# Patient Record
Sex: MALE | Race: WHITE | NOT HISPANIC OR LATINO | Employment: OTHER | URBAN - METROPOLITAN AREA
[De-identification: names, ages, dates, MRNs, and addresses within clinical notes are randomized per-mention and may not be internally consistent; named-entity substitution may affect disease eponyms.]

---

## 2017-09-02 ENCOUNTER — APPOINTMENT (OUTPATIENT)
Dept: MRI IMAGING | Facility: CLINIC | Age: 82
End: 2017-09-02
Attending: EMERGENCY MEDICINE
Payer: MEDICARE

## 2017-09-02 ENCOUNTER — HOSPITAL ENCOUNTER (OUTPATIENT)
Facility: CLINIC | Age: 82
Setting detail: OBSERVATION
Discharge: HOME | End: 2017-09-03
Attending: EMERGENCY MEDICINE | Admitting: INTERNAL MEDICINE
Payer: MEDICARE

## 2017-09-02 DIAGNOSIS — G45.8 OTHER SPECIFIED TRANSIENT CEREBRAL ISCHEMIAS: ICD-10-CM

## 2017-09-02 DIAGNOSIS — F02.818 EARLY ONSET ALZHEIMER'S DISEASE WITH BEHAVIORAL DISTURBANCE (H): ICD-10-CM

## 2017-09-02 DIAGNOSIS — I10 ESSENTIAL HYPERTENSION: ICD-10-CM

## 2017-09-02 DIAGNOSIS — G45.3 AMAUROSIS FUGAX: Primary | ICD-10-CM

## 2017-09-02 DIAGNOSIS — G30.0 EARLY ONSET ALZHEIMER'S DISEASE WITH BEHAVIORAL DISTURBANCE (H): ICD-10-CM

## 2017-09-02 LAB
ANION GAP SERPL CALCULATED.3IONS-SCNC: 6 MMOL/L (ref 3–14)
BASOPHILS # BLD AUTO: 0.1 10E9/L (ref 0–0.2)
BASOPHILS NFR BLD AUTO: 1.2 %
BUN SERPL-MCNC: 20 MG/DL (ref 7–30)
CALCIUM SERPL-MCNC: 8.5 MG/DL (ref 8.5–10.1)
CHLORIDE SERPL-SCNC: 106 MMOL/L (ref 94–109)
CO2 SERPL-SCNC: 30 MMOL/L (ref 20–32)
CREAT SERPL-MCNC: 1.17 MG/DL (ref 0.66–1.25)
DIFFERENTIAL METHOD BLD: ABNORMAL
EOSINOPHIL # BLD AUTO: 0.1 10E9/L (ref 0–0.7)
EOSINOPHIL NFR BLD AUTO: 2 %
ERYTHROCYTE [DISTWIDTH] IN BLOOD BY AUTOMATED COUNT: 13.3 % (ref 10–15)
GFR SERPL CREATININE-BSD FRML MDRD: 59 ML/MIN/1.7M2
GLUCOSE SERPL-MCNC: 82 MG/DL (ref 70–99)
HCT VFR BLD AUTO: 40.1 % (ref 40–53)
HGB BLD-MCNC: 13.5 G/DL (ref 13.3–17.7)
IMM GRANULOCYTES # BLD: 0 10E9/L (ref 0–0.4)
IMM GRANULOCYTES NFR BLD: 0 %
LYMPHOCYTES # BLD AUTO: 2.1 10E9/L (ref 0.8–5.3)
LYMPHOCYTES NFR BLD AUTO: 34.4 %
MCH RBC QN AUTO: 31.8 PG (ref 26.5–33)
MCHC RBC AUTO-ENTMCNC: 33.7 G/DL (ref 31.5–36.5)
MCV RBC AUTO: 94 FL (ref 78–100)
MONOCYTES # BLD AUTO: 0.6 10E9/L (ref 0–1.3)
MONOCYTES NFR BLD AUTO: 9.7 %
NEUTROPHILS # BLD AUTO: 3.2 10E9/L (ref 1.6–8.3)
NEUTROPHILS NFR BLD AUTO: 52.7 %
NRBC # BLD AUTO: 0 10*3/UL
NRBC BLD AUTO-RTO: 0 /100
PLATELET # BLD AUTO: 217 10E9/L (ref 150–450)
POTASSIUM SERPL-SCNC: 3.9 MMOL/L (ref 3.4–5.3)
RBC # BLD AUTO: 4.25 10E12/L (ref 4.4–5.9)
SODIUM SERPL-SCNC: 142 MMOL/L (ref 133–144)
WBC # BLD AUTO: 6.1 10E9/L (ref 4–11)

## 2017-09-02 PROCEDURE — 70553 MRI BRAIN STEM W/O & W/DYE: CPT

## 2017-09-02 PROCEDURE — 70549 MR ANGIOGRAPH NECK W/O&W/DYE: CPT

## 2017-09-02 PROCEDURE — A9585 GADOBUTROL INJECTION: HCPCS | Performed by: RADIOLOGY

## 2017-09-02 PROCEDURE — 80048 BASIC METABOLIC PNL TOTAL CA: CPT | Performed by: EMERGENCY MEDICINE

## 2017-09-02 PROCEDURE — 93005 ELECTROCARDIOGRAM TRACING: CPT

## 2017-09-02 PROCEDURE — 25000128 H RX IP 250 OP 636: Performed by: RADIOLOGY

## 2017-09-02 PROCEDURE — 99285 EMERGENCY DEPT VISIT HI MDM: CPT | Mod: 25

## 2017-09-02 PROCEDURE — 70544 MR ANGIOGRAPHY HEAD W/O DYE: CPT | Mod: XS

## 2017-09-02 PROCEDURE — 85025 COMPLETE CBC W/AUTO DIFF WBC: CPT | Performed by: EMERGENCY MEDICINE

## 2017-09-02 RX ORDER — GADOBUTROL 604.72 MG/ML
10 INJECTION INTRAVENOUS ONCE
Status: COMPLETED | OUTPATIENT
Start: 2017-09-02 | End: 2017-09-02

## 2017-09-02 RX ADMIN — GADOBUTROL 10 ML: 604.72 INJECTION INTRAVENOUS at 21:34

## 2017-09-02 NOTE — IP AVS SNAPSHOT
MRN:[id number]                      After Visit Summary   9/2/2017    Milton Marmolejo    MRN: [id number]           Thank you!     Thank you for choosing [facility] for your care. Our goal is always to provide you with excellent care. Hearing back from our patients is one way we can continue to improve our services. Please take a few minutes to complete the written survey that you may receive in the mail after you visit. If you would like to speak to someone directly about your visit please contact Patient Relations at [phone]. Thank you!          Patient Information     Date Of Birth          8/28/1932        About your hospital stay     You were admitted on:  September 3, 2017 You last received care in the:  [facility] Observation Department    You were discharged on:  September 3, 2017        Reason for your hospital stay       You were seen and admitted for episode of garbled speech and arm weakness.  You likely had a TIA (no CVA).  You were seen by Neurology and recommendations were to continue with a baby ASA and close monitoring of your BP with your PCP.                  Who to Call     For medical emergencies, please call 911.  For non-urgent questions about your medical care, please call your primary care provider or clinic, None          Attending Provider     Provider Specialty    Carolina Carlson MD Emergency Medicine    Carmel, Eemly Tse MD Internal Medicine    Laura Ohara,  Internal Medicine       Primary Care Provider Fax #    [facility] [phone]      After Care Instructions     Activity       Your activity upon discharge: activity as tolerated and no driving for today            Diet       Follow this diet upon discharge: resume home cardiac diet                  Follow-up Appointments     Follow-up and recommended labs and tests        F/u with PCP within 1-2 wks for hospital f/u and a BP check.  F/U with  "Neurology within one months for hospital f/u (referral sent)                  Additional Services     Home Care OT Referral for Hospital Discharge       OT to eval and treat    Your provider has ordered home care - occupational therapy. If you have not been contacted within 2 days of your discharge please call the department phone number listed on the top of this document.            Home care nursing referral       RN skilled nursing visit. RN to assess vital signs and weight and respiratory and cardiac status.    Your provider has ordered home care nursing services. If you have not been contacted within 2 days of your discharge please call the inpatient department phone number at [phone] .            Neurology Adult Referral                            Pending Results     Date and Time Order Name Status Description    9/3/2017 1246 Anti Nuclear Janiya IgG by IFA with Reflex In process     9/3/2017 1246 Antineutrophil cytoplasmic Janiya IgG In process             Statement of Approval     Ordered          09/03/17 1424  I have reviewed and agree with all the recommendations and orders detailed in this document.  EFFECTIVE NOW     Approved and electronically signed by:  Laura Ohara DO             Admission Information     Date & Time Provider Department Dept. Phone    9/2/2017 Laura Ohara,  [facility] Observation Department [phone]      Your Vitals Were     Blood Pressure Pulse Temperature Respirations Height Weight    125/77 76 97.6  F (36.4  C) (Oral) 18 1.778 m (5' 10\") 79.8 kg (176 lb)    Pulse Oximetry BMI (Body Mass Index)                95% 25.25 kg/m2          MyChart Information     [employer] lets you send messages to your doctor, view your test results, renew your prescriptions, schedule appointments and more. To sign up, go to [email or web address] . Click on \"Log in\" on the left side of the screen, which will take you to the Welcome page. Then click on \"Sign " "up Now\" on the right side of the page.     You will be asked to enter the access code listed below, as well as some personal information. Please follow the directions to create your username and password.     Your access code is: [email or web address]  Expires: 2017 11:26 PM     Your access code will  in 90 days. If you need help or a new code, please call your [facility] clinic or [phone].        Care EveryWhere ID     This is your Care EveryWhere ID. This could be used by other organizations to access your [facility] medical records  [id number]        Equal Access to Services     : Hadjace Martinez, debra whitlock, sydnee fragoso, true aaron . So [facility] [phone].    ATENCIÓN: Si habla español, tiene a moore disposición servicios gratuitos de asistencia lingüística. Llame al [phone].    We comply with applicable federal civil rights laws and [place] laws. We do not discriminate on the basis of race, color, national origin, age, disability sex, sexual orientation or gender identity.               Review of your medicines      CONTINUE these medicines which have NOT CHANGED        Dose / Directions    ACETAMINOPHEN PO        Dose:  325 mg   Take 325 mg by mouth every 6 hours as needed for pain   Refills:  0       ASPIRIN PO        Dose:  81 mg   Take 81 mg by mouth At Bedtime   Refills:  0       donepezil 2.5 mg half-tab   Commonly known as:  ARICEPT        Dose:  2.5 mg   Take 2.5 mg by mouth At Bedtime   Refills:  0       fluticasone 50 MCG/ACT spray   Commonly known as:  FLONASE        Dose:  2 spray   Spray 2 sprays into both nostrils daily as needed   Refills:  0       multivitamin, therapeutic with minerals Tabs tablet        Dose:  1 tablet   Take 1 tablet by mouth daily   Refills:  0       SIMVASTATIN PO        Dose:  40 mg   Take 40 mg by mouth At Bedtime   Refills:  0       tamsulosin 0.4 MG capsule   Commonly known as:  FLOMAX    "     Dose:  0.4 mg   Take 0.4 mg by mouth daily   Refills:  0       VASOTEC PO        Dose:  5 mg   Take 5 mg by mouth At Bedtime   Refills:  0                Protect others around you: Learn how to safely use, store and throw away your medicines at www.disposemymeds.org.             Medication List: This is a list of all your medications and when to take them. Check marks below indicate your daily home schedule. Keep this list as a reference.      Medications           Morning Afternoon Evening Bedtime As Needed    ACETAMINOPHEN PO   Take 325 mg by mouth every 6 hours as needed for pain                                ASPIRIN PO   Take 81 mg by mouth At Bedtime                                donepezil 2.5 mg half-tab   Commonly known as:  ARICEPT   Take 2.5 mg by mouth At Bedtime                                fluticasone 50 MCG/ACT spray   Commonly known as:  FLONASE   Spray 2 sprays into both nostrils daily as needed                                multivitamin, therapeutic with minerals Tabs tablet   Take 1 tablet by mouth daily                                SIMVASTATIN PO   Take 40 mg by mouth At Bedtime                                tamsulosin 0.4 MG capsule   Commonly known as:  FLOMAX   Take 0.4 mg by mouth daily                                VASOTEC PO   Take 5 mg by mouth At Bedtime

## 2017-09-02 NOTE — IP AVS SNAPSHOT
[facility] Observation Department    [address]    [facility] [address]    Phone:  [phone]                                       After Visit Summary   9/2/2017    Milton Marmolejo    MRN: [id number]           After Visit Summary Signature Page     I have received my discharge instructions, and my questions have been answered. I have discussed any challenges I see with this plan with the nurse or doctor.    ..........................................................................................................................................  Patient/Patient Representative Signature      ..........................................................................................................................................  Patient Representative Print Name and Relationship to Patient    ..................................................               ................................................  Date                                            Time    ..........................................................................................................................................  Reviewed by Signature/Title    ...................................................              ..............................................  Date                                                            Time

## 2017-09-03 ENCOUNTER — APPOINTMENT (OUTPATIENT)
Dept: CARDIOLOGY | Facility: CLINIC | Age: 82
End: 2017-09-03
Attending: INTERNAL MEDICINE
Payer: MEDICARE

## 2017-09-03 VITALS
BODY MASS INDEX: 25.2 KG/M2 | OXYGEN SATURATION: 95 % | WEIGHT: 176 LBS | DIASTOLIC BLOOD PRESSURE: 77 MMHG | SYSTOLIC BLOOD PRESSURE: 125 MMHG | TEMPERATURE: 97.6 F | HEART RATE: 76 BPM | RESPIRATION RATE: 18 BRPM | HEIGHT: 70 IN

## 2017-09-03 PROBLEM — G45.9 TIA (TRANSIENT ISCHEMIC ATTACK): Status: ACTIVE | Noted: 2017-09-03

## 2017-09-03 LAB
CHOLEST SERPL-MCNC: 115 MG/DL
CRP SERPL-MCNC: <2.9 MG/L (ref 0–8)
CRP SERPL-MCNC: <2.9 MG/L (ref 0–8)
ERYTHROCYTE [SEDIMENTATION RATE] IN BLOOD BY WESTERGREN METHOD: 11 MM/H (ref 0–20)
GLUCOSE BLDC GLUCOMTR-MCNC: 85 MG/DL (ref 70–99)
GLUCOSE SERPL-MCNC: 94 MG/DL (ref 70–99)
HBA1C MFR BLD: 5.8 % (ref 4.3–6)
HDLC SERPL-MCNC: 50 MG/DL
INTERPRETATION ECG - MUSE: NORMAL
LDLC SERPL CALC-MCNC: 56 MG/DL
NONHDLC SERPL-MCNC: 65 MG/DL
TRIGL SERPL-MCNC: 43 MG/DL
TSH SERPL DL<=0.005 MIU/L-ACNC: 2.11 MU/L (ref 0.4–4)

## 2017-09-03 PROCEDURE — 86255 FLUORESCENT ANTIBODY SCREEN: CPT | Performed by: INTERNAL MEDICINE

## 2017-09-03 PROCEDURE — 86140 C-REACTIVE PROTEIN: CPT | Performed by: INTERNAL MEDICINE

## 2017-09-03 PROCEDURE — 82947 ASSAY GLUCOSE BLOOD QUANT: CPT | Performed by: INTERNAL MEDICINE

## 2017-09-03 PROCEDURE — 36415 COLL VENOUS BLD VENIPUNCTURE: CPT | Performed by: INTERNAL MEDICINE

## 2017-09-03 PROCEDURE — 80061 LIPID PANEL: CPT | Performed by: INTERNAL MEDICINE

## 2017-09-03 PROCEDURE — G0378 HOSPITAL OBSERVATION PER HR: HCPCS

## 2017-09-03 PROCEDURE — 99207 ZZC APP CREDIT; MD BILLING SHARED VISIT: CPT | Performed by: INTERNAL MEDICINE

## 2017-09-03 PROCEDURE — 93306 TTE W/DOPPLER COMPLETE: CPT

## 2017-09-03 PROCEDURE — 99207 ZZC CDG-CODE CATEGORY CHANGED: CPT | Performed by: INTERNAL MEDICINE

## 2017-09-03 PROCEDURE — 83036 HEMOGLOBIN GLYCOSYLATED A1C: CPT | Performed by: INTERNAL MEDICINE

## 2017-09-03 PROCEDURE — 99234 HOSP IP/OBS SM DT SF/LOW 45: CPT | Performed by: INTERNAL MEDICINE

## 2017-09-03 PROCEDURE — 85652 RBC SED RATE AUTOMATED: CPT | Performed by: INTERNAL MEDICINE

## 2017-09-03 PROCEDURE — 86038 ANTINUCLEAR ANTIBODIES: CPT | Performed by: INTERNAL MEDICINE

## 2017-09-03 PROCEDURE — 84443 ASSAY THYROID STIM HORMONE: CPT | Performed by: INTERNAL MEDICINE

## 2017-09-03 PROCEDURE — 93306 TTE W/DOPPLER COMPLETE: CPT | Mod: 26 | Performed by: INTERNAL MEDICINE

## 2017-09-03 PROCEDURE — 82947 ASSAY GLUCOSE BLOOD QUANT: CPT | Performed by: PHYSICIAN ASSISTANT

## 2017-09-03 PROCEDURE — 00000146 ZZHCL STATISTIC GLUCOSE BY METER IP

## 2017-09-03 PROCEDURE — 86039 ANTINUCLEAR ANTIBODIES (ANA): CPT | Performed by: INTERNAL MEDICINE

## 2017-09-03 RX ORDER — NALOXONE HYDROCHLORIDE 0.4 MG/ML
.1-.4 INJECTION, SOLUTION INTRAMUSCULAR; INTRAVENOUS; SUBCUTANEOUS
Status: DISCONTINUED | OUTPATIENT
Start: 2017-09-03 | End: 2017-09-03 | Stop reason: HOSPADM

## 2017-09-03 RX ORDER — CLOPIDOGREL BISULFATE 75 MG/1
75 TABLET ORAL DAILY
Status: DISCONTINUED | OUTPATIENT
Start: 2017-09-03 | End: 2017-09-03

## 2017-09-03 RX ORDER — LIDOCAINE 40 MG/G
CREAM TOPICAL
Status: DISCONTINUED | OUTPATIENT
Start: 2017-09-03 | End: 2017-09-03 | Stop reason: HOSPADM

## 2017-09-03 RX ORDER — ACETAMINOPHEN 325 MG/1
650 TABLET ORAL EVERY 4 HOURS PRN
Status: DISCONTINUED | OUTPATIENT
Start: 2017-09-03 | End: 2017-09-03 | Stop reason: HOSPADM

## 2017-09-03 RX ORDER — LABETALOL HYDROCHLORIDE 5 MG/ML
10-40 INJECTION, SOLUTION INTRAVENOUS EVERY 10 MIN PRN
Status: DISCONTINUED | OUTPATIENT
Start: 2017-09-03 | End: 2017-09-03 | Stop reason: HOSPADM

## 2017-09-03 ASSESSMENT — ENCOUNTER SYMPTOMS
SPEECH DIFFICULTY: 1
NUMBNESS: 0
HEADACHES: 0
FEVER: 1
WEAKNESS: 0

## 2017-09-03 NOTE — ED NOTES
"[facility]  ED Nurse Handoff Report    Milton Marmolejo is a 85 year old male   ED Chief complaint: stroke like symptoms  . ED Diagnosis:   Final diagnoses:   Other specified transient cerebral ischemias   Essential hypertension     Allergies: No Known Allergies    Code Status: Full Code  Activity level - Baseline/Home:  Independent. Activity Level - Current:   Independent. Lift room needed: No. Bariatric: No   Needed: No   Isolation: No. Infection: Not Applicable.     Vital Signs:   Vitals:    09/02/17 2227 09/02/17 2228 09/02/17 2230 09/02/17 2245   BP:       Pulse:       Resp:       Temp:       TempSrc:       SpO2: 96% 96% 95% 96%   Weight:       Height:           Cardiac Rhythm:  ,      Pain level:    Patient confused: No. Patient Falls Risk: No.   Elimination Status: Has voided   Patient Report - Initial Complaint: Pt presents to ED with complaints of difficulty finding words starting around 7pm. Pt says he had \"trouble adjusting the TV and I didn't seem quite right.\" Pt has hx of TIA. Pt says he feels like his symptoms are mostly resolved now. ABCs intact. A&Ox3. Wife says pt had garbled speech for about 5 minutes. Focused Assessment: Cognitive/Perceptual/Neuro - Cognitive/Neuro/Behavioral WDL:  WDL except Best Language: 0 - No aphasia LUE Sensation: no numbness; no tingling RUE Sensation: no numbness; no tingling LLE Sensation: no tingling; no numbness RLE Sensation: no tingling; no numbness Neurological Comment: episode of garbled speech and confusion around 7pm today; some mild confusion d/t dementia; family says pt at baseline now  Pupils (CN II) - Pupil PERRLA: yes  Motor Strength - Left Upper: 5 - active movement against gravity and full resistance Right Upper: 5 - active movement against gravity and full resistance Left Lower: 5 - active movement against gravity and full resistance Right Lower: 5 - active movement against gravity and full resistance  [place] Coma Scale - Best " Eye Response: 4-->(E4) spontaneous Best Motor Response: 6-->(M6) obeys commands Best Verbal Response: 5-->(V5) oriented [place] Coma Scale Score: 15   Tests Performed: labs, MRI. Abnormal Results:   Labs Ordered and Resulted from Time of ED Arrival Up to the Time of Departure from the ED   CBC WITH PLATELETS DIFFERENTIAL - Abnormal; Notable for the following:        Result Value    RBC Count 4.25 (*)     All other components within normal limits   BASIC METABOLIC PANEL - Abnormal; Notable for the following:     GFR Estimate 59 (*)     All other components within normal limits     .   Treatments provided: none  Family Comments: son and wife at bedside  OBS brochure/video discussed/provided to patient:  Yes  ED Medications:   Medications   gadobutrol (GADAVIST) injection 10 mL (10 mLs Intravenous Given 9/2/17 2134)   sodium chloride (PF) 0.9% PF flush 60 mL (60 mLs Intravenous Given 9/2/17 2134)     Drips infusing:  No  For the majority of the shift this patient was Green. Interventions performed were none.     Severe Sepsis OR Septic Shock Diagnosis Present: No      ED Nurse Name/Phone Number: Shari Castanomingo,   11:57 PM    RECEIVING UNIT ED HANDOFF REVIEW    Above ED Nurse Handoff Report was reviewed: Yes  Reviewed by: Brigitte Rausch on September 3, 2017 at 12:56 AM

## 2017-09-03 NOTE — ED PROVIDER NOTES
History     Chief Complaint:  stroke like symptoms       HPI   Milton Marmolejo is a 85 year old male who presents with history of multiple TIAs, slurred speech and evaluation of stroke.  At approximately 6:45 pm, he had difficulty speaking with slurred speech and word salad.  It lasted for 5 minutes.  Denies numbness, tingling, and weakness.  Wife did not notice any facial droop.  He feels normal now and has no speech changes.  He denies chest pain and headache. Patient has had TIA work-up before--thinks last time was in the [facility] system ~4 years ago.      Allergies:      NKDA    Medications:      tamsulosin (FLOMAX) 0.4 MG 24 hr capsule   SIMVASTATIN PO   Enalapril Maleate (VASOTEC PO)   Donepezil HCl (ARICEPT PO)   ACETAMINOPHEN PO   fluticasone (FLONASE) 50 MCG/ACT nasal spray   ASPIRIN PO   multivitamin, therapeutic with minerals (THERA-VIT-M) TABS   Omega-3 Fatty Acids (OMEGA-3 FISH OIL PO)       Past Medical History:    Past Medical History:   Diagnosis Date     Hypertension      TIA (transient ischemic attack)        There are no active problems to display for this patient.     Past Surgical History:    No past surgical history on file.     Family History:    family history is not on file.    Social History:     Denies smoking.     [facility]: [facility]     Review of Systems   Constitutional: Positive for fever.   Eyes: Negative for visual disturbance.   Neurological: Positive for speech difficulty. Negative for weakness, numbness and headaches.   All other systems reviewed and are negative.        Physical Exam     Patient Vitals for the past 24 hrs:   BP Temp Temp src Pulse Heart Rate Resp SpO2 Height Weight   09/03/17 0100 - - - - - - 95 % - -   09/03/17 0045 - - - - - - 95 % - -   09/03/17 0029 - - - - - - 96 % - -   09/03/17 0028 - - - - - - 96 % - -   09/03/17 0027 (!) 157/112 - - - - - - - -   09/02/17 2245 - - - - - - 96 % - -   09/02/17 2230 - - - - 67 - 95 % - -   09/02/17  "2228 - - - - 72 - 96 % - -   09/02/17 2227 - - - - 72 - 96 % - -   09/02/17 2226 - - - - 73 - 97 % - -   09/02/17 2115 (!) 136/94 - - - 71 - 99 % - -   09/02/17 2100 (!) 144/91 - - - 69 - 98 % - -   09/02/17 2045 (!) 143/96 - - - 70 - 96 % - -   09/02/17 2030 (!) 154/96 - - - 72 - 97 % - -   09/02/17 2015 (!) 161/99 - - - 74 - 97 % - -   09/02/17 2005 (!) 173/107 97.9  F (36.6  C) Oral 77 77 16 96 % 1.778 m (5' 10\") 79.4 kg (175 lb)        Physical Exam  Constitutional: Alert, attentive, GCS 15, well appearing  HENT:    Nose: Nose normal.    Mouth/Throat: Oropharynx is clear, mucous membranes are moist   Eyes: Normal conjunctiva. Pupils are equal, round, and reactive to light.   CV: regular rate and rhythm; no murmurs, rubs or gallups  Chest: Effort normal and breath sounds normal.   GI:  There is no tenderness. No distension. Normal bowel sounds  MSK: Normal range of motion.   Neurological: Alert, attentive, oriented x4, Normal strength and tone, sensory exam grossly normal, mentation intact, cranial nerves 3-12 intact and rapid alternating movements normal, speech normal, consensual fields intact, NIHSS 0  Skin: Skin is warm and dry.      Emergency Department Course     ECG (20:08:28):  Rate 78 bpm. NSR, inferior Q waves also seen on previous EKG on  2/3/16.   QT/QTc 392/446.   P-R-T axes 72 32 73.    Interpreted at 8:24 pm by Carolina Carlson MD.     Imaging:  MR Brain w/o & w Contrast   Final Result   IMPRESSION:    1. No acute pathology is identified. No bleed, mass, or acute infarcts   are seen.   2. Age-related changes including generalized atrophy of the brain.   White matter changes in the cerebral hemispheres consistent with small   vessel ischemic disease in this age patient.   3. Multiple small punctate areas of susceptibility artifact. These are   consistent with hemosiderin deposition secondary to small chronic   microbleed's. This pattern is nonspecific. It can be seen in patients   with chronic " hypertension. Cerebral amyloid can also cause this   pattern.      SCOTT FINK MD      Head MRA w/o contrast - STROKE PROTOCOL   Final Result   IMPRESSION:   1. Mild  fusiform dilatation of the terminal segment of the left   distal internal carotid artery, the proximal basilar artery, and the   M1 segment of the right middle cerebral.   2. No occluded intracranial vessels are identified. No high-grade   intracranial vascular stenosis is seen.   3. 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      SCOTT FINK MD      Neck MRA w & w/o contrast - STROKE PROTOCOL   Final Result   IMPRESSION: No significant stenosis is seen at either carotid   bifurcation. No arterial dissection is seen.      SCOTT FINK MD           Laboratory:  Labs Ordered and Resulted from Time of ED Arrival Up to the Time of Departure from the ED   CBC WITH PLATELETS DIFFERENTIAL - Abnormal; Notable for the following:        Result Value    RBC Count 4.25 (*)     All other components within normal limits   BASIC METABOLIC PANEL - Abnormal; Notable for the following:     GFR Estimate 59 (*)     All other components within normal limits        Interventions:  Medications   gadobutrol (GADAVIST) injection 10 mL (10 mLs Intravenous Given 9/2/17 2134)   sodium chloride (PF) 0.9% PF flush 60 mL (60 mLs Intravenous Given 9/2/17 2134)        Emergency Department Course:  Past medical records, nursing notes, and vitals reviewed.  I performed an exam of the patient and obtained history, as documented above.    I rechecked the patient and he has not had any recurrence of neurologic symptoms.  Currently feels well.  I discussed the patient with Dr. Boss from neurology, who recommended overnight observation due to elevated blood pressure and microhemorrhages on MRI.  Findings and plan explained to the Patient and his family. Patient was amenable to admission.    Impression & Plan      Medical Decision Making:  Milton Marmolejo is a 85 year old male who  presents for evaluation of aphasia and slurred speech.  The story is very concerning for TIA.  Had NIHSS of 0 on initial evaluation, so stroke activation not initiated.  Imaging as noted above; microhemorrhages, no large area of bleed or ischemic stroke and a dilation seen of his ICA.  The on call neurologist recommended overnight observation to watch BP given it was elevated and he has microhemorrhages seen on MRI.  Will admit to medicine with neurology consulting for further cares.  Will need further workup including echocardiogram and discussion about antiplatelet medication (already on baby aspirin).     Diagnosis:    ICD-10-CM    1. Other specified transient cerebral ischemias G45.8    2. Essential hypertension I10         Disposition: Admission     9/2/2017   Carolina Carlson MD Lum, Marija Margaret, MD  09/03/17 0111

## 2017-09-03 NOTE — ED NOTES
"Pt presents to ED with complaints of difficulty finding words starting around 7pm. Pt says he had \"trouble adjusting the TV and I didn't seem quite right.\" Pt has hx of TIA. Pt says he feels like his symptoms are mostly resolved now. ABCs intact. A&Ox3. Wife says pt had garbled speech for about 5 minutes.   "

## 2017-09-03 NOTE — H&P
[facility]    History and Physical  Hospitalist    Name: Milton Marmolejo    MRN: [id number]  YOB: 1932    Age: 85 year old  Primary care provider: [place]       Date of Admission:  9/2/2017  Date of Service (when I saw the patient): 09/03/17    Assessment & Plan   Milton Marmolejo is a 85 year old male with a history of HTN, TIA, cognitive impairment who presents with episode of speech difficulty. Concern for possible TIA. Family reports that there have been some episodes of speech mumbling and patient swearing in the last few weeks which is out of norm for him. His MRI brain was negative for acute stroke, although showed hemosiderin deposition and possible chronic micro bleeds.     #1.  Concern for TIA.  Patient described symptoms similar to expressive aphasia.  He reports that he knew what he wanted to say, but the words did not come out right.  His symptoms completely resolved and at this time he is back to his baseline.  His MRI of the brain is negative for any acute stroke.  -Clay to observation for monitoring of neurological status.  -Update echocardiogram  -Watch on cardiac telemetry to monitor for arrhythmia  -Permissive hypertension, IV labetalol will be available for markedly elevated blood pressure  -Long-term needs good blood pressure control   -Swallow screen  -Routine stroke cares  -He has been taking a baby aspirin regularly, we'll switch him to Plavix 75 mg daily    #2.  Certainly patient is at risk for ischemic event and TIA causing this episode given his risk factor of hypertension, advanced age, hyperlipidemia.  However, also need to consider possibility of cerebral amyloid angiopathy given his progressive memory problems, MRI findings, and previous transient neurological episodes.  -We will request a formal neurology consult to advise, particularly if it is safe to use long-term antiplatelet therapy like Plavix with concerns for the  chronic micro-bleeds  -Consider stopping statin therapy if felt to have cerebral amyloid angiopathy  -Check lipid panel, hemoglobin A1c, TSH, CRP    #3.  Hypertension.  Hold blood pressure medication for now.  Allow permissive hypertension    #4.  Cognitive impairment.  Patient tells me that he had extensive neuro testing and was told that he does not have Alzheimer's.  He does take Aricept 5 mg daily    #4.  Somewhat unclear if his previous episodes where he is having change of his personality and swearing is truly related TIA.  It might be part of dementia symptoms or related to #2.    DVT Prophylaxis: Pneumatic Compression Devices  Code Status: Full Code    Disposition: Expected discharge in 1 days once stroke workup completed and pending neurology consultation    Plan of care was discussed with the patient and his family including his significant other and son-in-law at bedside in great detail. All of the questions were answered extensively. Patient is comfortable with the plan and agrees with it.     Emely Burt    Chief Complaint   Speech difficulties    History is obtained from the patient.  Case also discussed with ER provider Dr. Carlson    History of Present Illness   Milton Marmolejo is a 85 year old male who presents with speech difficulties.  Patient mentions that this started around 7:00 last evening.  He says that he knew what he wanted to say, but the words did not come out right.  His speech was noticed to be garbled for about 5 minutes and then his symptoms completely resolved.  He denies any focal weakness, numbness, any visual symptoms.  He feels at his baseline right now and wondering about when he will go home.  Family also feels that his speech is back to baseline now.  No other complaints or concerns are voiced    Patient tells me that he had a few episodes of TIA many years ago in the 1990s.  He has been on a daily aspirin and doing well mostly.  Family does tell me that they have  noticed episodes where his speech would get mumbled and there are times when he will start swearing for the last few weeks.  He also has been having some memory difficulties.  Does take Aricept for cognitive impairment    Past Medical History    I have reviewed this patient's medical history and updated it with pertinent information if needed.   Past Medical History:   Diagnosis Date     Hypertension      TIA (transient ischemic attack)        Past Surgical History   I have reviewed this patient's surgical history and updated it with pertinent information if needed.  No past surgical history on file.    Prior to Admission Medications   Prior to Admission Medications   Prescriptions Last Dose Informant Patient Reported? Taking?   ACETAMINOPHEN PO   Yes No   Sig: Take 325 mg by mouth   ASPIRIN PO   Yes No   Sig: Take 81 mg by mouth   Donepezil HCl (ARICEPT PO)   Yes No   Sig: Take 5 mg by mouth   Enalapril Maleate (VASOTEC PO)   Yes No   Sig: Take 5 mg by mouth   Omega-3 Fatty Acids (OMEGA-3 FISH OIL PO)   Yes No   SIMVASTATIN PO   Yes No   Sig: Take 40 mg by mouth   fluticasone (FLONASE) 50 MCG/ACT nasal spray   Yes No   Sig: Spray 2 sprays into both nostrils daily   multivitamin, therapeutic with minerals (THERA-VIT-M) TABS   Yes No   Sig: Take 1 tablet by mouth daily   tamsulosin (FLOMAX) 0.4 MG 24 hr capsule   Yes No   Sig: Take by mouth daily      Facility-Administered Medications: None     Allergies   No Known Allergies    Social History   I have reviewed this patient's social history and updated it with pertinent information if needed. Milton Marmolejo      Family History   Reviewed and non-contributory     Review of Systems   The 10 point Review of Systems is negative other than noted in the HPI or here.     Physical Exam   Temp: 97.9  F (36.6  C) Temp src: Oral BP: (!) 136/94 Pulse: 77 Heart Rate: 67 Resp: 16 SpO2: 96 %      Vital Signs with Ranges  Temp:  [97.9  F (36.6  C)] 97.9  F (36.6  C)  Pulse:  [77]  77  Heart Rate:  [67-77] 67  Resp:  [16] 16  BP: (136-173)/() 136/94  SpO2:  [95 %-99 %] 96 %  175 lbs 0 oz    GENERAL:  Awake and alert, Comfortable appearing, No acute distress.  PSYCH: Pleasant, Cooperative, normal affect, no hallucinations   EYES: EOMI, conjunctiva clear  HEENT:  Head is normocephalic, atraumatic, Neck is Supple, trachea is midline   CARDIOVASCULAR: Regular rate and rhythm, Normal S1, S2, no loud murmurs, no rubs or gallops.   PULMONARY:  Clear to auscultation bilaterally with good entry on both sides  CHEST: Good inspiratory effort bilaterally   GI: Abdomen is soft, non tender, non-distended, no masses palpated, normal bowel sounds. No rebound or guarding   SKIN:  Dry, warm to touch. No obvious exanthems on exposed areas  EXTREMITIES:  Good capillary refill with signs of adequate peripheral perfusion. No peripheral edema   Neuro: Awake and oriented x 3. No focal weakness or numbness is noted. Moving all extremities with good strength, no dysmetria, speech is clear, no facial droop, cranial nerves are grossly intact.   MSK: Good range of motion in all major joints of upper and lower extremity, No acute joint synovitis of the major joints is noted.     Data   Data reviewed today:  I personally reviewed the EKG tracing showing NSR.    All lab data and imaging results from today have been reviewed.       Recent Labs  Lab 09/02/17 2008   WBC 6.1   HGB 13.5   MCV 94         POTASSIUM 3.9   CHLORIDE 106   CO2 30   BUN 20   CR 1.17   ANIONGAP 6   STACIA 8.5   GLC 82       Recent Results (from the past 24 hour(s))   Head MRA w/o contrast - STROKE PROTOCOL    Narrative    MRA ANGIOGRAM HEAD W/O CONTRAST   9/2/2017 10:11 PM     HISTORY:  Word finding difficulty, garbled speech.    TECHNIQUE:  3D time-of-flight MR angiogram of the head without  contrast.    COMPARISON: None.    FINDINGS: There is a moderate stenosis in the right carotid siphon.  The left carotid siphon is unremarkable.  There is fusiform dilatation  of the terminal segment of the left internal carotid. There is also  fusiform dilatation of the proximal basilar artery. Mild fusiform  dilatation of the M1 segment of the right middle cerebral is also  noted. The visualized portions of the vertebral arteries, the basilar  artery, [place] of [place], and the proximal anterior and posterior  cerebral arteries all appear normal. There is no evidence of aneurysm  or vascular stenosis or occlusion.      Impression    IMPRESSION:  1. Mild  fusiform dilatation of the terminal segment of the left  distal internal carotid artery, the proximal basilar artery, and the  M1 segment of the right middle cerebral.  2. No occluded intracranial vessels are identified. No high-grade  intracranial vascular stenosis is seen.  3. 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    SCOTT FINK MD   MR Brain w/o & w Contrast    Narrative    MR BRAIN W/O & W CONTRAST 9/2/2017 10:24 PM     HISTORY: hx of TIA, transient aphasia and slurred speech, eval for  stroke    TECHNIQUE: Multiplanar, multisequence MRI of the brain without and  with 10 mL Gadavist  IV contrast material.    COMPARISON: CT scan dated to/to/2016    FINDINGS:  There is generalized atrophy of the brain.  White matter  changes are present in the cerebral hemispheres that are consistent  with small vessel ischemic disease in this age patient there appears  to be a chronic lacunar type infarct in the right basal ganglia  region. On susceptibility weighted images, there are multiple punctate  areas of susceptibility artifact. These are consistent with  hemosiderin deposition secondary to chronic microbleed's. A small  lesion is seen in the anterior shawn. Lesions are seen in the left  temporal lobe, right and left caudate nuclei, and in the white matter  of the left frontal lobe.. There is no evidence of acute hemorrhage,  mass, demyelination, acute infarct, or anomaly. There are no  gadolinium enhancing  lesions. The facial structures appear normal. The  arteries at the base of the brain and the dural venous sinuses appear  patent.      Impression    IMPRESSION:   1. No acute pathology is identified. No bleed, mass, or acute infarcts  are seen.  2. Age-related changes including generalized atrophy of the brain.  White matter changes in the cerebral hemispheres consistent with small  vessel ischemic disease in this age patient.  3. Multiple small punctate areas of susceptibility artifact. These are  consistent with hemosiderin deposition secondary to small chronic  microbleed's. This pattern is nonspecific. It can be seen in patients  with chronic hypertension. Cerebral amyloid can also cause this  pattern.    SCOTT FINK MD   Neck MRA w & w/o contrast - STROKE PROTOCOL    Narrative    MRA ANGIOGRAM NECK W/O & W CONTRAST 9/2/2017 10:29 PM     HISTORY:  eval clot    TECHNIQUE:  Sequential axial images of the neck were obtained using  2-dimensional time-of-flight before contrast and 3-dimensional  time-of-flight after the uneventful administration of 10 mL Gadavist  iv contrast. The post gadolinium images are of relatively.    COMPARISON:  None.    FINDINGS: Stenosis is relative to the distal internal carotid  diameter.    Right Carotid:  No significant stenosis is seen at the bifurcation  relative to the distal internal carotid diameter.    Left Carotid:  No significant stenosis is seen at the bifurcation  relative to the distal internal carotid diameter.    Vertebrals: Antegrade flow is seen in both vertebral arteries.    No arterial dissection is identified.      Impression    IMPRESSION: No significant stenosis is seen at either carotid  bifurcation. No arterial dissection is seen.    SCOTT FINK MD

## 2017-09-03 NOTE — PLAN OF CARE
Problem: Discharge Planning  Goal: Discharge Planning (Adult, OB, Behavioral, Peds)  Outcome: Adequate for Discharge Date Met:  09/03/17  Patient's After Visit Summary was reviewed with patient: Yes  Patient verbalized understanding of After Visit Summary, recommended follow up and was given an opportunity to ask questions.   Discharge medications sent home with patient/family: NA  Discharged with Family     OBSERVATION patient END time: 1510

## 2017-09-03 NOTE — PLAN OF CARE
Problem: Discharge Planning  Goal: Discharge Planning (Adult, OB, Behavioral, Peds)  Outcome: Improving  PRIMARY DIAGNOSIS: TIA  OUTPATIENT/OBSERVATION GOALS TO BE MET BEFORE DISCHARGE:     1. Diagnostic testing complete & WNL: MRI is negative, to have further tests completed in am  2. Cleared by neurology consultant (if involved): No  3. Patient at neurological baseline: Yes  4. ADLs back to baseline?  Yes  5. Activity and level of assistance: Up with standby assistance.  6. Pain status: Pain free.  7. Barriers to discharge noted No  8. Interpretation of rhythm per telemetry tech: SR HR 73     Patient will see  Neurology in am, to have a echo in am and lab work.  Patient's speech is clear.  Will need to do swallow study.  Patient declined a drink at this time and will check with him later.   Neuro assessment in flowsheet.

## 2017-09-03 NOTE — PLAN OF CARE
Problem: Discharge Planning  Goal: Discharge Planning (Adult, OB, Behavioral, Peds)  Outcome: Improving  Problem: Discharge Planning  Goal: Discharge Planning (Adult, OB, Behavioral, Peds)  Outcome: Improving  PRIMARY DIAGNOSIS: TIA  OUTPATIENT/OBSERVATION GOALS TO BE MET BEFORE DISCHARGE:      1. Diagnostic testing complete & WNL: MRI complete, see results. Plan for Echo with bubble today.  2. Cleared by neurology consultant (if involved): No, neurology consult today  3. Patient at neurological baseline: Yes  4. ADLs back to baseline?  Yes  5. Activity and level of assistance: Up with standby assistance.  6. Pain status: Pain free.  7. Barriers to discharge noted: Neurology consult and echo results  8. Interpretation of rhythm per telemetry tech: SR HR 73      Vitals stable. Pt alert and oriented x4. SBA for increased safety. No slurred speech. Neuros intact. Pt denies pain, light headedness, or numbness/tingling. Tolerated regular diet with no difficulties. Plan for Neurology consult today. Echo in process. Will continue to monitor and provide supportive cares.

## 2017-09-03 NOTE — PROGRESS NOTES
Pt seen and examined with family present. Pt admitted earlier today and H&P reviewed in detail. Symptoms described are right arm numbness and garbled speech around 7pm, lasting about 5 minutes.  Has not re-occurred since OBS admission.  Echo pending.  Neuro consult present (appreciated).  LDL reviewed and under good control at 56.

## 2017-09-03 NOTE — DISCHARGE SUMMARY
[facility]    Discharge Summary  Hospitalist    Date of Admission:  9/2/2017  Date of Discharge:  9/3/2017  3:15 PM  Provider:  Laura Ohara DO    Discharge Diagnoses   1.  TIA  2.  MRI brain changes with hemosiderin deposits  3.  HTN  4.  Mild cognitive impairment    Other medical issues:  Past Medical History:   Diagnosis Date     Hypertension      TIA (transient ischemic attack)        History of Present Illness   Milton Marmolejo is an 85 year old male who presented with brief episode of garbled speech and right arm numbness.  Please see the admission history and physical for full details.    Hospital Course   Milton Marmolejo was admitted on 9/2/2017.  The following problems were addressed during his hospitalization:    1.  TIA  Mr. Marmolejo is a 84 yo male who presents to the ER after having an episode of garbled speech and right arm numbness lasting several minutes.  He was brought to the ER and did not have re-occurrence of these symptoms.  He had a head CT that was without abnormalities and a MRI that revealed changes consistent with chronic small vessel disease and hemosiderin deposits.  Neurology was consulted and recommended continuing on his daily ASA.  CRP and ESR were within normal limits.  A NAVID and ANCA were ordered and are pending at time of d/c.  A neurology f/u appointment was recommended and an outpt consult was placed.  He will need to f/u with his PCP to ensure that his BP stays under good control.  His LDL is low at 56, so no changes were made to his statin therapy.  He also had an echo with a negative bubble study on day of d/c.    Recommended a zio patch cardiac monitor at time of d/c, but pt declined.  This could be re-addressed in clinic f/u with PCP.    2. HTN  His BP was quite elevated on arrival to the ER, but came down nicely on his home meds on day of d/c.  No changes were made in his medications.  Home RN will come to their home for a post-hospital visit  and check his BP cuff.      3.  Mild cognitive impairment  He has been started on aricept.  He states that he is still driving short distances.  I am concerned about safe medication set-up (he does them himself).  Home RN and OT ordered at time of dc for evaluation of home safety.      Significant Results and Procedures   ECHO - 9/3/17     The left ventricle is normal in size with mild concentric left ventricular  hypertrophy.  The visual ejection fraction is estimated at 55-60%. The biplane calculated  ejection fraction is estimated at 55% with no WMAs noted.  Grade I or early diastolic dysfunction is present.  Normal left atrial size.  The aortic valve is likely trileaflet but a bicuspid aortic valve cannot be  excluded. There is trace aortic regurgitation.  The aortic Sinus(es) of Valsalva are mildly dilated at 4.0 cm. (The upper  limit of normal is 3.7cm for aortic root diameter.)  The right ventricular systolic pressure is approximated at 30.7mmHg plus the  right atrial pressure.  Right ventricular systolic pressure is elevated, consistent with mild  pulmonary hypertension.     There is no atrial shunt seen. A contrast injection (Bubble Study) was  performed that was negative for flow across the interatrial septum.  The mean AoV pressure gradient is 5.0 mmHg. The peak AoV pressure gradient is  9.0 mmHg.    Pending Results   Unresulted Labs Ordered in the Past 30 Days of this Admission     Date and Time Order Name Status Description    9/3/2017 1246 Anti Nuclear Janiya IgG by IFA with Reflex In process     9/3/2017 1246 Antineutrophil cytoplasmic Janiya IgG In process           Code Status   Full Code       Primary Care Physician   [facility]    Physical Exam   Temp: 97.6  F (36.4  C) Temp src: Oral BP: 126/80 Pulse: 71 Heart Rate: 75 Resp: 16 SpO2: 94 % O2 Device: None (Room air) Oxygen Delivery: 2 LPM  Vitals:    09/02/17 2005 09/03/17 0114   Weight: 79.4 kg (175 lb) 79.8 kg (176 lb)     Vital Signs  with Ranges  Temp:  [97.6  F (36.4  C)-98.4  F (36.9  C)] 97.6  F (36.4  C)  Pulse:  [71-77] 71  Heart Rate:  [65-79] 75  Resp:  [16] 16  BP: (126-173)/() 126/80  SpO2:  [93 %-99 %] 94 %     PT SEEN AND EXAMINED ON DAY OF D/C  GEN:  Alert, oriented to self, wife, hospital.  Somewhat restless, but not uncomfortable  HEENT:  Normocephalic/atraumatic, PERRL, no scleral icterus, no nasal discharge, mouth and membranes moist  CV:  Regular rate and rhythm, no loud murmur to ausc.  S1 + S2 noted, no S3 or S4.  LUNGS:  Clear to auscultation bilaterally.  No rales/rhonchi/wheezing auscultated bilaterally.  No costal retractions bilaterally.  Symmetric chest rise on inhalation noted.  ABD:  Active bowel sounds, soft, non-tender/non-distended.  No rebound/guarding/rigidity.  No masses palpated.  No obvious HSM to exam.  EXT:  No edema or cyanosis bilaterally. No joint synovitis noted.  No calf-tenderness or asymmetry noted.  SKIN:  Dry to touch, no rashes or jaundice noted.  PSYCH:  Mood mildly agitated/restless, Not tearful or depressed.  Maintains direct eye contact.  NEURO:  No tremors at rest, speech clear and appropriate.  Following commands and directions well.  Moving all ext without diff/deficits.  Hand  strong and equal bilaterally.  No strength deficits noted on bilateral arm/leg testing.    Discharge Disposition   Discharged to home    Consultations This Hospital Stay   NEUROLOGY IP CONSULT    Time Spent on this Encounter   ILaura, personally saw the patient today and spent greater than 30 minutes discharging this patient.    Discharge Orders     Home care nursing referral     Home Care OT Referral for Hospital Discharge     Neurology Adult Referral     Reason for your hospital stay   You were seen and admitted for episode of garbled speech and arm weakness.  You likely had a TIA (no CVA).  You were seen by Neurology and recommendations were to continue with a baby ASA and close  monitoring of your BP with your PCP.     Follow-up and recommended labs and tests    F/u with PCP within 1-2 wks for hospital f/u and a BP check.  F/U with Neurology within one months for hospital f/u (referral sent)     Activity   Your activity upon discharge: activity as tolerated and no driving for today     MD face to face encounter   Documentation of Face to Face and Certification for Home Health Services    I certify that patient: Milton Marmolejo is under my care and that I, or a nurse practitioner or physician's assistant working with me, had a face-to-face encounter that meets the physician face-to-face encounter requirements with this patient on: 9/3/2017.    This encounter with the patient was in whole, or in part, for the following medical condition, which is the primary reason for home health care: recent TIA and need for close BP monitoring.    I certify that, based on my findings, the following services are medically necessary home health services: Nursing and Occupational Therapy.    My clinical findings support the need for the above services because: Nurse is needed: To assess BP and symtpoms after hospitalization..    Further, I certify that my clinical findings support that this patient is homebound (i.e. absences from home require considerable and taxing effort and are for medical reasons or [facility] services or infrequently or of short duration when for other reasons) because: Mental health symptoms including: Patient gets lost or wanders to due to cognitive impairments.. and Requires assistance of another person or specialized equipment to access medical services because patient: Is prone to wander/get lost without assistance...    Based on the above findings. I certify that this patient is confined to the home and needs intermittent skilled nursing care, physical therapy and/or speech therapy.  The patient is under my care, and I have initiated the establishment of the plan of care.  This  patient will be followed by a physician who will periodically review the plan of care.  Physician/Provider to provide follow up care: [facility] physician ([facility] Medicare [facility]): Laura Ohara  Physician Signature: See electronic signature associated with these discharge orders.  Date: 9/3/2017     Full Code     Diet   Follow this diet upon discharge: resume home cardiac diet       Discharge Medications   Current Discharge Medication List      CONTINUE these medications which have NOT CHANGED    Details   donepezil (ARICEPT) 2.5 mg half-tab Take 2.5 mg by mouth At Bedtime      tamsulosin (FLOMAX) 0.4 MG 24 hr capsule Take 0.4 mg by mouth daily       SIMVASTATIN PO Take 40 mg by mouth At Bedtime       Enalapril Maleate (VASOTEC PO) Take 5 mg by mouth At Bedtime       ACETAMINOPHEN PO Take 325 mg by mouth every 6 hours as needed for pain       fluticasone (FLONASE) 50 MCG/ACT nasal spray Spray 2 sprays into both nostrils daily as needed       multivitamin, therapeutic with minerals (THERA-VIT-M) TABS Take 1 tablet by mouth daily      ASPIRIN PO Take 81 mg by mouth At Bedtime            Allergies   No Known Allergies  Data    Results for JUAN RICHARD (MRN [id number]) as of 9/3/2017 14:16   Ref. Range 9/3/2017 13:17   CRP Inflammation Latest Ref Range: 0.0 - 8.0 mg/L <2.9   Sed Rate Latest Ref Range: 0 - 20 mm/h 11       Recent Labs  Lab 09/02/17 2008   WBC 6.1   HGB 13.5   HCT 40.1   MCV 94          Recent Labs  Lab 09/03/17  0631 09/02/17 2008   NA  --  142   POTASSIUM  --  3.9   CHLORIDE  --  106   CO2  --  30   ANIONGAP  --  6   GLC 94 82   BUN  --  20   CR  --  1.17   GFRESTIMATED  --  59*   GFRESTBLACK  --  72   STACIA  --  8.5     No results for input(s): LACT in the last 168 hours.    Recent Labs  Lab 09/03/17  0631   TSH 2.11     No results for input(s): TROPONIN, TROPI, TROPR in the last 168 hours.    Invalid input(s): TROP,  TROPONINIES  No results for input(s): COLOR, APPEARANCE, URINEGLC, URINEBILI, URINEKETONE, SG, UBLD, URINEPH, PROTEIN, UROBILINOGEN, NITRITE, LEUKEST, RBCU, WBCU in the last 168 hours.  Results for orders placed or performed during the hospital encounter of 09/02/17   Head MRA w/o contrast - STROKE PROTOCOL    Narrative    MRA ANGIOGRAM HEAD W/O CONTRAST   9/2/2017 10:11 PM     HISTORY:  Word finding difficulty, garbled speech.    TECHNIQUE:  3D time-of-flight MR angiogram of the head without  contrast.    COMPARISON: None.    FINDINGS: There is a moderate stenosis in the right carotid siphon.  The left carotid siphon is unremarkable. There is fusiform dilatation  of the terminal segment of the left internal carotid. There is also  fusiform dilatation of the proximal basilar artery. Mild fusiform  dilatation of the M1 segment of the right middle cerebral is also  noted. The visualized portions of the vertebral arteries, the basilar  artery, [place], and the proximal anterior and posterior  cerebral arteries all appear normal. There is no evidence of aneurysm  or vascular stenosis or occlusion.      Impression    IMPRESSION:  1. Mild  fusiform dilatation of the terminal segment of the left  distal internal carotid artery, the proximal basilar artery, and the  M1 segment of the right middle cerebral.  2. No occluded intracranial vessels are identified. No high-grade  intracranial vascular stenosis is seen.  3. 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    SCOTT FINK MD   Neck MRA w & w/o contrast - STROKE PROTOCOL    Narrative    MRA ANGIOGRAM NECK W/O & W CONTRAST 9/2/2017 10:29 PM     HISTORY:  eval clot    TECHNIQUE:  Sequential axial images of the neck were obtained using  2-dimensional time-of-flight before contrast and 3-dimensional  time-of-flight after the uneventful administration of 10 mL Gadavist  iv contrast. The post gadolinium images are of relatively.    COMPARISON:  None.    FINDINGS:  Stenosis is relative to the distal internal carotid  diameter.    Right Carotid:  No significant stenosis is seen at the bifurcation  relative to the distal internal carotid diameter.    Left Carotid:  No significant stenosis is seen at the bifurcation  relative to the distal internal carotid diameter.    Vertebrals: Antegrade flow is seen in both vertebral arteries.    No arterial dissection is identified.      Impression    IMPRESSION: No significant stenosis is seen at either carotid  bifurcation. No arterial dissection is seen.    SCOTT FINK MD   MR Brain w/o & w Contrast    Narrative    MR BRAIN W/O & W CONTRAST 9/2/2017 10:24 PM     HISTORY: hx of TIA, transient aphasia and slurred speech, eval for  stroke    TECHNIQUE: Multiplanar, multisequence MRI of the brain without and  with 10 mL Gadavist  IV contrast material.    COMPARISON: CT scan dated to/to/2016    FINDINGS:  There is generalized atrophy of the brain.  White matter  changes are present in the cerebral hemispheres that are consistent  with small vessel ischemic disease in this age patient there appears  to be a chronic lacunar type infarct in the right basal ganglia  region. On susceptibility weighted images, there are multiple punctate  areas of susceptibility artifact. These are consistent with  hemosiderin deposition secondary to chronic microbleed's. A small  lesion is seen in the anterior shawn. Lesions are seen in the left  temporal lobe, right and left caudate nuclei, and in the white matter  of the left frontal lobe.. There is no evidence of acute hemorrhage,  mass, demyelination, acute infarct, or anomaly. There are no  gadolinium enhancing lesions. The facial structures appear normal. The  arteries at the base of the brain and the dural venous sinuses appear  patent.      Impression    IMPRESSION:   1. No acute pathology is identified. No bleed, mass, or acute infarcts  are seen.  2. Age-related changes including generalized atrophy of the  brain.  White matter changes in the cerebral hemispheres consistent with small  vessel ischemic disease in this age patient.  3. Multiple small punctate areas of susceptibility artifact. These are  consistent with hemosiderin deposition secondary to small chronic  microbleed's. This pattern is nonspecific. It can be seen in patients  with chronic hypertension. Cerebral amyloid can also cause this  pattern.    SCOTT FINK MD

## 2017-09-03 NOTE — PHARMACY-ADMISSION MEDICATION HISTORY
Admission medication history interview status for this patient is complete. See [facility] admission navigator for allergy information, prior to admission medications and immunization status.     Medication history interview source(s):Patient and Daughter  Medication history resources (including written lists, pill bottles, clinic record):None  Primary [facility]    Changes made to PTA medication list:  Added: NA  Deleted: fish oil  Changed: donepezil    Actions taken by pharmacist (provider contacted, etc):Called daughter     Additional medication history information:None    Medication reconciliation/reorder completed by provider prior to medication history? No    Do you take OTC medications (eg tylenol, ibuprofen, fish oil, eye/ear drops, etc)? Y(Y/N)    For patients on insulin therapy: N (Y/N)  Lantus/levemir/NPH/Mix 70/30 dose:   (Y/N) (see Med list for doses)   Sliding scale Novolog Y/N  If Yes, do you have a baseline novolog pre-meal dose:  units with meals  Patients eat three meals a day:   Y/N    How many episodes of hypoglycemia do you have per week: _______  How many missed doses do you have per week: ______  How many times do you check your blood glucose per day: _______   Any Barriers to therapy - Be specific :  cost of medications, comfortable with giving injections (if applicable), comfortable and confident with current diabetes regimen: Y/N ______________      Prior to Admission medications    Medication Sig Last Dose Taking? Auth Provider   donepezil (ARICEPT) 2.5 mg half-tab Take 2.5 mg by mouth At Bedtime 9/1/2017 at HS Yes Unknown, Entered By History   tamsulosin (FLOMAX) 0.4 MG 24 hr capsule Take 0.4 mg by mouth daily  9/2/2017 at AM Yes Reported, Patient   SIMVASTATIN PO Take 40 mg by mouth At Bedtime  9/1/2017 at HS Yes Reported, Patient   Enalapril Maleate (VASOTEC PO) Take 5 mg by mouth 9/2/2017 at HS Yes Reported, Patient   ACETAMINOPHEN PO Take 325 mg by mouth every 6 hours as needed for pain   Past Week at Unknown time Yes Reported, Patient   fluticasone (FLONASE) 50 MCG/ACT nasal spray Spray 2 sprays into both nostrils daily as needed  Past Week at Unknown time Yes Reported, Patient   multivitamin, therapeutic with minerals (THERA-VIT-M) TABS Take 1 tablet by mouth daily 9/2/2017 at AM Yes Reported, Patient   ASPIRIN PO Take 81 mg by mouth 9/1/2017 at HS  Reported, Patient

## 2017-09-03 NOTE — CONSULTS
[facility]  Neurology Consultation         Jesenia Lincoln MD    Milton Marmolejo MRN# [id number]   YOB: 1932 Age: 85 year old      Date of Admission:  9/2/2017  Date of Consult: 9/3/2017               Primary Care Physician:   [facility] None         Requesting Physician:      Dr. Burt         History of Present Illness:   Milton Marmolejo is a 85 year old male with history of HTN, prior multiple TIAs, who presented to ED on 9/2/2017 after event of difficulty with word finding and slurred speech and right hand numbness lasting about 5 minutes occurring at 6:45pm.  In ED initial BP was 173/110 and remained elevated with /111.  Initial work up with EKG with NSR with inferior Q waves (seen on prior study).  MRI/MRA performed with with no acute stroke but with concern for hemosiderin deposition secondary to small chronic microbleeds with possible cerebral amyloid.  MRA with no significant occludion but mild fusiform dilatation of several blood vessels.  He was admitted for observation.   At baseline concern for cognitive problems and family has reported behavior changes with swearing. His family describes that at baseline mild cognitive concerns and has had work up with neurology in [place] many years ago.  He reports told his blood pressure has been normal on testing in the past.  He continues to drive.  He describes about 3-5 similar events in the past. With last event multiple years ago.  Has been taking baby ASA for years.   Today,  He reports feels at baseline.              Past Medical History:     Patient Active Problem List   Diagnosis     TIA (transient ischemic attack)      Past Medical History:   Diagnosis Date     Hypertension      TIA (transient ischemic attack)              Past Surgical History:   No past surgical history on file.           Home Medications:     Prior to Admission medications    Medication Sig Last Dose Taking? Auth Provider  "  donepezil (ARICEPT) 2.5 mg half-tab Take 2.5 mg by mouth At Bedtime 9/1/2017 at HS Yes Unknown, Entered By History   tamsulosin (FLOMAX) 0.4 MG 24 hr capsule Take 0.4 mg by mouth daily  9/2/2017 at AM Yes Reported, Patient   SIMVASTATIN PO Take 40 mg by mouth At Bedtime  9/1/2017 at HS Yes Reported, Patient   Enalapril Maleate (VASOTEC PO) Take 5 mg by mouth At Bedtime  9/2/2017 at HS Yes Reported, Patient   ACETAMINOPHEN PO Take 325 mg by mouth every 6 hours as needed for pain  Past Week at Unknown time Yes Reported, Patient   fluticasone (FLONASE) 50 MCG/ACT nasal spray Spray 2 sprays into both nostrils daily as needed  Past Week at Unknown time Yes Reported, Patient   multivitamin, therapeutic with minerals (THERA-VIT-M) TABS Take 1 tablet by mouth daily 9/2/2017 at AM Yes Reported, Patient   ASPIRIN PO Take 81 mg by mouth At Bedtime  9/1/2017 at HS  Reported, Patient            Current Medications:           sodium chloride (PF)  3 mL Intracatheter Q8H     lidocaine (buffered or not buffered), lidocaine 4%, sodium chloride (PF), acetaminophen, naloxone, labetalol         Allergies:   No Known Allergies         Social History:   Lives at home with wife.  Non smoker non drinker             Family History:   No family history on file.            Review of Systems:   The 10 point Review of Systems is negative other than noted in the HPI.             Physical Exam:   Heart Rate: 75, Blood pressure 132/80, pulse 77, temperature 98.1  F (36.7  C), temperature source Oral, resp. rate 16, height 1.778 m (5' 10\"), weight 79.8 kg (176 lb), SpO2 97 %.  176 lbs 0 oz     Cardio - Heart Rate Reg, Distal pulses palpable  Resp - Breathing non labored    Neurological Exam:  General: Mental status -Alert and orientated, speech without dysarthria, language fluent with intact naming and repetition  Cranial Nerves- Visual acuity intact to finger counting. Pupils equal round and reactive to light. Extraocular movements intact. No " nystagmus.  Face symmetric and intact to light touch, tongue midline, palate activates symmetrically. Shoulder shrug 5/5  Motor: Normal muscle bulk and tone.  Has 5/5 strength in bilateral upper and lower extremities both proximally and distally.   Sensation: intact to light touch and pinprick throughout upper and lower extremities.   Reflexes:  Biceps 2/2, , patella 1/1, toes neutral.   Cerebellar: intact FNF  Gait: narrow based and steady.           Data:   All new lab and imaging data was reviewed.    Recent Labs  Lab 09/03/17  0631 09/02/17 2008   WBC  --  6.1   HGB  --  13.5   MCV  --  94   PLT  --  217   NA  --  142   POTASSIUM  --  3.9   CHLORIDE  --  106   CO2  --  30   BUN  --  20   CR  --  1.17   ANIONGAP  --  6   STACIA  --  8.5   GLC 94 82     9/3/2017 HgA1c 5.8, LDL 56, HDL 50, triglycerides 43, total 115  TSh 2.11, Trop neg,     MR BRAIN W/O & W CONTRAST 9/2/2017 10:24 PM      HISTORY: hx of TIA, transient aphasia and slurred speech, eval for  stroke     TECHNIQUE: Multiplanar, multisequence MRI of the brain without and  with 10 mL Gadavist  IV contrast material.     COMPARISON: CT scan dated to/to/2016     FINDINGS:  There is generalized atrophy of the brain.  White matter  changes are present in the cerebral hemispheres that are consistent  with small vessel ischemic disease in this age patient there appears  to be a chronic lacunar type infarct in the right basal ganglia  region. On susceptibility weighted images, there are multiple punctate  areas of susceptibility artifact. These are consistent with  hemosiderin deposition secondary to chronic microbleed's. A small  lesion is seen in the anterior shawn. Lesions are seen in the left  temporal lobe, right and left caudate nuclei, and in the white matter  of the left frontal lobe.. There is no evidence of acute hemorrhage,  mass, demyelination, acute infarct, or anomaly. There are no  gadolinium enhancing lesions. The facial structures appear normal.  The  arteries at the base of the brain and the dural venous sinuses appear  patent.         IMPRESSION:   1. No acute pathology is identified. No bleed, mass, or acute infarcts  are seen.  2. Age-related changes including generalized atrophy of the brain.  White matter changes in the cerebral hemispheres consistent with small  vessel ischemic disease in this age patient.  3. Multiple small punctate areas of susceptibility artifact. These are  consistent with hemosiderin deposition secondary to small chronic  microbleed's. This pattern is nonspecific. It can be seen in patients  with chronic hypertension. Cerebral amyloid can also cause this  pattern.     SCOTT FINK MD      MRA ANGIOGRAM HEAD W/O CONTRAST   9/2/2017 10:11 PM      HISTORY:  Word finding difficulty, garbled speech.     TECHNIQUE:  3D time-of-flight MR angiogram of the head without  contrast.     COMPARISON: None.     FINDINGS: There is a moderate stenosis in the right carotid siphon.  The left carotid siphon is unremarkable. There is fusiform dilatation  of the terminal segment of the left internal carotid. There is also  fusiform dilatation of the proximal basilar artery. Mild fusiform  dilatation of the M1 segment of the right middle cerebral is also  noted. The visualized portions of the vertebral arteries, the basilar  artery, [place], and the proximal anterior and posterior  cerebral arteries all appear normal. There is no evidence of aneurysm  or vascular stenosis or occlusion.         IMPRESSION:  1. Mild  fusiform dilatation of the terminal segment of the left  distal internal carotid artery, the proximal basilar artery, and the  M1 segment of the right middle cerebral.  2. No occluded intracranial vessels are identified. No high-grade  intracranial vascular stenosis is seen.  3. 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    MRA ANGIOGRAM NECK W/O & W CONTRAST 9/2/2017 10:29 PM      HISTORY:  eval clot     TECHNIQUE:  Sequential  axial images of the neck were obtained using  2-dimensional time-of-flight before contrast and 3-dimensional  time-of-flight after the uneventful administration of 10 mL Gadavist  iv contrast. The post gadolinium images are of relatively.     COMPARISON:  None.     FINDINGS: Stenosis is relative to the distal internal carotid  diameter.     Right Carotid:  No significant stenosis is seen at the bifurcation  relative to the distal internal carotid diameter.     Left Carotid:  No significant stenosis is seen at the bifurcation  relative to the distal internal carotid diameter.     Vertebrals: Antegrade flow is seen in both vertebral arteries.     No arterial dissection is identified.         IMPRESSION: No significant stenosis is seen at either carotid  bifurcation. No arterial dissection is seen.        SCOTT FINK MD        Echo 9/3/2017 - Pending           Assessment and Plan:     1. TIA   2. MRI brain with T2/flair changes consistent with chronic small vessel disease, hemosiderin deposits concerning for hypertensive changes vs amyloid angiopathy  3. Mild cognitive impairment - per family report  4. Hypertension    Milton Marmolejo is a 85 year old male with history of HTN, prior multiple TIAs, who presented to ED on 9/2/2017 after event of difficulty with word finding and slurred speech and right hand numbness lasting about 5 minutes occurring at 6:45pm.  In ED initial BP was 173/110 and remained elevated with /111.  MRI/MRA performed with with no acute stroke but with concern for hemosiderin deposition secondary to small chronic microbleeds with possible cerebral amyloid.  MRA with no significant occludion but mild fusiform dilatation of several blood vessels.  Blood pressure has been gradually improving and denies any further neuro changes.  Exam with no focal findings.    On personal review of MRI imaging hemosiderin changes are seen in shawn and basal ganglia areas consistent with HTN.   I discussed with  the patient and the family that a diagnosis of cerebral amyloid angiopathy is possible but most concerned about hypertension.     Plan  1. Recommended good Bp control and close follow up with eventual goal level 120/80 but with gradual adjustment give presumed TIA  2.  Continue ASA 81 - discussed stopping given possible risk for bleed with amyloid but discussed risk of bleed vs clot formation and will not increase to more aggressive anti-platelet  3. Obtain NAVID, ANCA, ESR, CRP to rule out other causes of angiopathy   4. Continue low dose statin - on review of literature no significant data to support stopping statin in amyloid at this time.  Recent LDL at goal  5. Echo results remain pending   6. Follow up as outpt for further eval of memory and monitoring

## 2017-09-03 NOTE — PLAN OF CARE
Problem: Discharge Planning  Goal: Discharge Planning (Adult, OB, Behavioral, Peds)  Outcome: No Change  Problem: Discharge Planning  Goal: Discharge Planning (Adult, OB, Behavioral, Peds)  Outcome: Improving  PRIMARY DIAGNOSIS: TIA  OUTPATIENT/OBSERVATION GOALS TO BE MET BEFORE DISCHARGE:      1. Diagnostic testing complete & WNL: MRI complete, see results. Awaiting echo results  2. Cleared by neurology consultant (if involved): Yes  3. Patient at neurological baseline: Yes  4. ADLs back to baseline?  Yes  5. Activity and level of assistance: Up with standby assistance.  6. Pain status: Pain free.  7. Barriers to discharge noted: Echo results  8. Interpretation of rhythm per telemetry tech: SR HR 70's      Vitals stable. Pt alert and oriented x4. SBA for increased safety. No slurred speech. Neuros intact. Pt denies pain, light headedness, or numbness/tingling. Tolerated regular diet with no difficulties. Echo complete, awaiting results. Neurology consult complete, see notes. Will continue to monitor and provide supportive cares.

## 2017-09-05 LAB — ANCA IGG TITR SER IF: NORMAL {TITER}

## 2017-09-07 LAB
ANA PAT SER IF-IMP: ABNORMAL
ANA SER QL IF: ABNORMAL
ANA TITR SER IF: ABNORMAL {TITER}

## 2018-08-14 ENCOUNTER — OFFICE VISIT (OUTPATIENT)
Dept: UROLOGY | Facility: CLINIC | Age: 83
End: 2018-08-14
Payer: MEDICARE

## 2018-08-14 VITALS — BODY MASS INDEX: 25.2 KG/M2 | OXYGEN SATURATION: 97 % | WEIGHT: 176 LBS | HEIGHT: 70 IN | HEART RATE: 68 BPM

## 2018-08-14 DIAGNOSIS — R35.0 URINARY FREQUENCY: ICD-10-CM

## 2018-08-14 DIAGNOSIS — N40.1 BENIGN PROSTATIC HYPERPLASIA WITH WEAK URINARY STREAM: Primary | ICD-10-CM

## 2018-08-14 DIAGNOSIS — R39.12 BENIGN PROSTATIC HYPERPLASIA WITH WEAK URINARY STREAM: Primary | ICD-10-CM

## 2018-08-14 LAB
ALBUMIN UR-MCNC: ABNORMAL MG/DL
APPEARANCE UR: CLEAR
BILIRUB UR QL STRIP: NEGATIVE
COLOR UR AUTO: YELLOW
GLUCOSE UR STRIP-MCNC: NEGATIVE MG/DL
HGB UR QL STRIP: NEGATIVE
KETONES UR STRIP-MCNC: NEGATIVE MG/DL
LEUKOCYTE ESTERASE UR QL STRIP: NEGATIVE
NITRATE UR QL: NEGATIVE
PH UR STRIP: 7 PH (ref 5–7)
RESIDUAL VOLUME (RV) (EXTERNAL): 0
SOURCE: ABNORMAL
SP GR UR STRIP: 1.02 (ref 1–1.03)
UROBILINOGEN UR STRIP-ACNC: 1 EU/DL (ref 0.2–1)

## 2018-08-14 PROCEDURE — 99204 OFFICE O/P NEW MOD 45 MIN: CPT | Mod: 25 | Performed by: UROLOGY

## 2018-08-14 PROCEDURE — 51798 US URINE CAPACITY MEASURE: CPT | Performed by: UROLOGY

## 2018-08-14 PROCEDURE — 81003 URINALYSIS AUTO W/O SCOPE: CPT | Mod: QW | Performed by: UROLOGY

## 2018-08-14 RX ORDER — TAMSULOSIN HYDROCHLORIDE 0.4 MG/1
0.4 CAPSULE ORAL DAILY
Qty: 30 CAPSULE | Refills: 3 | Status: SHIPPED | OUTPATIENT
Start: 2018-08-14

## 2018-08-14 ASSESSMENT — PAIN SCALES - GENERAL: PAINLEVEL: NO PAIN (0)

## 2018-08-14 NOTE — PROGRESS NOTES
Chief Complaint:    Urinary frequency         History of Present Illness:   Milton Marmolejo is a very pleasant 85 year old male who presents with a history of BPH with urinary frequency. Marked weakening of stream. Most bothersome symptom is intermittent urinary frequency. Sometimes every 30 minutes - usually every 2 hours. Nocturia x 2. Urinary urgency/frequency occurs on occasion, particularly while out shopping. No hematuria or dysuria. No other urinary symptoms or urologic history.         Past Medical History:     Past Medical History:   Diagnosis Date     Hypertension      Mumps      TIA (transient ischemic attack)    Dementia  Skin tumor         Past Surgical History:   History reviewed. No pertinent surgical history.   Appendectomy  History of leg fracture         Medications     Current Outpatient Prescriptions   Medication     ACETAMINOPHEN PO     ASPIRIN PO     donepezil (ARICEPT) 2.5 mg half-tab     Enalapril Maleate (VASOTEC PO)     fluticasone (FLONASE) 50 MCG/ACT nasal spray     multivitamin, therapeutic with minerals (THERA-VIT-M) TABS     SIMVASTATIN PO     tamsulosin (FLOMAX) 0.4 MG 24 hr capsule     No current facility-administered medications for this visit.           Family History:   History reviewed. No pertinent family history.   Brother with prostate cancer - had prostatectomy         Social History:     Social History     Social History     Marital status:      Spouse name: N/A     Number of children: N/A     Years of education: N/A     Occupational History     Not on file.     Social History Main Topics     Smoking status: Never Smoker     Smokeless tobacco: Never Used     Alcohol use Not on file     Drug use: Not on file     Sexual activity: Not on file     Other Topics Concern     Not on file     Social History Narrative   Worked as a          Allergies:   Review of patient's allergies indicates no known allergies.         Review of Systems:  From intake  "questionnaire     Negative 14 system review except as noted on HPI, nurse's note.         Physical Exam:     Patient is a 85 year old  male   Vitals: Pulse 68, height 1.778 m (5' 10\"), weight 79.8 kg (176 lb), SpO2 97 %.  General Appearance Adult: Body mass index is 25.25 kg/(m^2).  Alert, no acute distress, oriented  HENT: throat/mouth:normal, good dentition  Lungs: no respiratory distress, or pursed lip breathing  Heart: No obvious jugular venous distension present  Abdomen: soft, nontender, no organomegaly or masses,   Lymphatics: No inguinal adenopathy  Musculoskeltal: extremities normal, no peripheral edema  Skin: no suspicious lesions or rashes  Neuro: Alert, oriented, speech and mentation normal  Psych: affect and mood normal  Gait: Normal  : penis, scrotum, testes normal,   ALICIA anodular, symmetric      Labs and Pathology:    I reviewed all applicable laboratory and pathology data and went over findings with patient  Significant for   Lab Results   Component Value Date    CR 1.17 09/02/2017    CR 1.32 02/03/2016     PVR = 0 ml      Outside and Past Medical records:    I spent 10 minutes reviewing outside and past medical records.         Assessment and Plan:     Assessment: 85 year old male with BPH and weak stream with urinary frequency. Empties well with PVR =0ml today. We discussed options including observation, but also discussed tamsulosin. We discussed natural history of BPH and mechanism of alpha blockers. Risks and side effects of tamsulosin were also discussed and he will give a trial of this over the next few months.    Plan:  Tamsulosin 0.4mg daily  F/u 3 months    Orders  Orders Placed This Encounter   Procedures     UA without Microscopic     Bladder scan     Laith Langley MD  Urology  [facility]  Clinic Phone [phone]      "

## 2018-08-14 NOTE — LETTER
8/14/2018       RE: Milton Marmolejo  [address] [facility] [address] [address]     Dear Colleague,    Thank you for referring your patient, Milton Marmolejo, to the [facility] [place] at [facility]. Please see a copy of my visit note below.          Chief Complaint:    Urinary frequency         History of Present Illness:   Milton Marmolejo is a very pleasant 85 year old male who presents with a history of BPH with urinary frequency. Marked weakening of stream. Most bothersome symptom is intermittent urinary frequency. Sometimes every 30 minutes - usually every 2 hours. Nocturia x 2. Urinary urgency/frequency occurs on occasion, particularly while out shopping. No hematuria or dysuria. No other urinary symptoms or urologic history.         Past Medical History:     Past Medical History:   Diagnosis Date     Hypertension      Mumps      TIA (transient ischemic attack)    Dementia  Skin tumor         Past Surgical History:   History reviewed. No pertinent surgical history.   Appendectomy  History of leg fracture         Medications     Current Outpatient Prescriptions   Medication     ACETAMINOPHEN PO     ASPIRIN PO     donepezil (ARICEPT) 2.5 mg half-tab     Enalapril Maleate (VASOTEC PO)     fluticasone (FLONASE) 50 MCG/ACT nasal spray     multivitamin, therapeutic with minerals (THERA-VIT-M) TABS     SIMVASTATIN PO     tamsulosin (FLOMAX) 0.4 MG 24 hr capsule     No current facility-administered medications for this visit.           Family History:   History reviewed. No pertinent family history.   Brother with prostate cancer - had prostatectomy         Social History:     Social History     Social History     Marital status:      Spouse name: N/A     Number of children: N/A     Years of education: N/A     Occupational History     Not on file.     Social History Main Topics     Smoking status: Never Smoker     Smokeless tobacco:  "Never Used     Alcohol use Not on file     Drug use: Not on file     Sexual activity: Not on file     Other Topics Concern     Not on file     Social History Narrative   Worked as a          Allergies:   Review of patient's allergies indicates no known allergies.         Physical Exam:     Patient is a 85 year old  male   Vitals: Pulse 68, height 1.778 m (5' 10\"), weight 79.8 kg (176 lb), SpO2 97 %.  General Appearance Adult: Body mass index is 25.25 kg/(m^2).  Alert, no acute distress, oriented  HENT: throat/mouth:normal, good dentition  Lungs: no respiratory distress, or pursed lip breathing  Heart: No obvious jugular venous distension present  Abdomen: soft, nontender, no organomegaly or masses,   Lymphatics: No inguinal adenopathy  Musculoskeltal: extremities normal, no peripheral edema  Skin: no suspicious lesions or rashes  Neuro: Alert, oriented, speech and mentation normal  Psych: affect and mood normal  Gait: Normal  : penis, scrotum, testes normal,   ALICIA anodular, symmetric      Labs and Pathology:    I reviewed all applicable laboratory and pathology data and went over findings with patient  Significant for   Lab Results   Component Value Date    CR 1.17 09/02/2017    CR 1.32 02/03/2016     PVR = 0 ml      Outside and Past Medical records:    I spent 10 minutes reviewing outside and past medical records.         Assessment and Plan:     Assessment: 85 year old male with BPH and weak stream with urinary frequency. Empties well with PVR =0ml today. We discussed options including observation, but also discussed tamsulosin. We discussed natural history of BPH and mechanism of alpha blockers. Risks and side effects of tamsulosin were also discussed and he will give a trial of this over the next few months.    Plan:  Tamsulosin 0.4mg daily  F/u 3 months    Orders  Orders Placed This Encounter   Procedures     UA without Microscopic     Bladder scan     Laith Langley MD  Urology  [facility]  Clinic Phone [phone]

## 2018-08-14 NOTE — NURSING NOTE
Pvr=0  Pt up 2 times a nt to void.  Pt states when he lifts something heavy he dribbles a small amount of urine.  MIRELA Castorena, CMA

## 2018-08-14 NOTE — MR AVS SNAPSHOT
"              After Visit Summary   8/14/2018    Milton Marmolejo    MRN: [id number]           Patient Information     Date Of Birth          8/28/1932        Visit Information        Provider Department      8/14/2018 9:30 AM Laith Langley MD [facility]        Today's Diagnoses     Benign prostatic hyperplasia with weak urinary stream    -  1    Urinary frequency           Follow-ups after your visit        Follow-up notes from your care team     Return in about 3 months (around 11/14/2018).      Your next 10 appointments already scheduled     Nov 13, 2018  8:30 AM CST   Return Visit with Laith Langley MD   [facility] ([facility] [place])    [address]  [address]  [facility] [address] [phone]              Who to contact     If you have questions or need follow up information about today's clinic visit or your schedule please contact [facility] directly at [phone].  Normal or non-critical lab and imaging results will be communicated to you by [employer], letter or phone within 4 business days after the clinic has received the results. If you do not hear from us within 7 days, please contact the clinic through [employer] or phone. If you have a critical or abnormal lab result, we will notify you by phone as soon as possible.  Submit refill requests through Medium or call your pharmacy and they will forward the refill request to us. Please allow 3 business days for your refill to be completed.          Additional Information About Your Visit        [employer] Information     Medium lets you send messages to your doctor, view your test results, renew your prescriptions, schedule appointments and more. To sign up, go to [email or web address] . Click on \"Log in\" on the left side of the screen, which will take you to the " "Welcome page. Then click on \"Sign up Now\" on the right side of the page.     You will be asked to enter the access code listed below, as well as some personal information. Please follow the directions to create your username and password.     Your access code is: [email or web address]  Expires: 2018  8:19 AM     Your access code will  in 90 days. If you need help or a new code, please call your [place] clinic or [phone].        Care EveryWhere ID     This is your Care EveryWhere ID. This could be used by other organizations to access your [place] medical records  [id number]        Your Vitals Were     Pulse Height Pulse Oximetry BMI (Body Mass Index)          68 1.778 m (5' 10\") 97% 25.25 kg/m2         Blood Pressure from Last 3 Encounters:   17 125/77   16 113/74    Weight from Last 3 Encounters:   18 79.8 kg (176 lb)   17 79.8 kg (176 lb)   16 78 kg (172 lb)              We Performed the Following     Bladder scan     UA without Microscopic          Where to get your medicines      These medications were sent to [facility] - [address], [facility] [address]     Phone:  [phone]     tamsulosin 0.4 MG capsule          Primary Care Provider Fax #    [facility] [phone] [address] [facility] [address]        Equal Access to Services     PAUL SAMS AH: Hadii aad ku hadasho Soomaali, waaxda luqadaha, qaybta kaalmada adeegyada, true marie. So [facility] [phone].    ATENCIÓN: Si shabanala regan, tiene a moore disposición servicios gratuitos de asistencia lingüística. Llame al [phone].    We comply with applicable federal civil rights laws and [place] laws. We do not discriminate on the basis of race, color, national origin, age, disability, sex, sexual orientation, or gender identity.            Thank you!     Thank you for choosing [facility] " [facility] [place]  for your care. Our goal is always to provide you with excellent care. Hearing back from our patients is one way we can continue to improve our services. Please take a few minutes to complete the written survey that you may receive in the mail after your visit with us. Thank you!             Your Updated Medication List - Protect others around you: Learn how to safely use, store and throw away your medicines at www.disposemymeds.org.          This list is accurate as of 8/14/18 11:59 PM.  Always use your most recent med list.                   Brand Name Dispense Instructions for use Diagnosis    ACETAMINOPHEN PO      Take 325 mg by mouth every 6 hours as needed for pain        ASPIRIN PO      Take 81 mg by mouth At Bedtime        donepezil 2.5 mg half-tab    ARICEPT     Take 2.5 mg by mouth At Bedtime        fluticasone 50 MCG/ACT spray    FLONASE     Spray 2 sprays into both nostrils daily as needed        multivitamin, therapeutic with minerals Tabs tablet      Take 1 tablet by mouth daily        SIMVASTATIN PO      Take 40 mg by mouth At Bedtime        tamsulosin 0.4 MG capsule    FLOMAX    30 capsule    Take 1 capsule (0.4 mg) by mouth daily    Benign prostatic hyperplasia with weak urinary stream       VASOTEC PO      Take 5 mg by mouth At Bedtime

## 2018-08-15 PROBLEM — R35.0 URINARY FREQUENCY: Status: ACTIVE | Noted: 2018-08-15

## 2018-08-15 PROBLEM — R39.12 BENIGN PROSTATIC HYPERPLASIA WITH WEAK URINARY STREAM: Status: ACTIVE | Noted: 2018-08-15

## 2018-08-15 PROBLEM — N40.1 BENIGN PROSTATIC HYPERPLASIA WITH WEAK URINARY STREAM: Status: ACTIVE | Noted: 2018-08-15

## 2018-11-13 ENCOUNTER — OFFICE VISIT (OUTPATIENT)
Dept: UROLOGY | Facility: CLINIC | Age: 83
End: 2018-11-13
Payer: MEDICARE

## 2018-11-13 VITALS — HEART RATE: 78 BPM | BODY MASS INDEX: 25.2 KG/M2 | OXYGEN SATURATION: 96 % | WEIGHT: 176 LBS | HEIGHT: 70 IN

## 2018-11-13 DIAGNOSIS — R35.0 URINARY FREQUENCY: Primary | ICD-10-CM

## 2018-11-13 LAB — RESIDUAL VOLUME (RV) (EXTERNAL): 14

## 2018-11-13 PROCEDURE — 51798 US URINE CAPACITY MEASURE: CPT | Performed by: UROLOGY

## 2018-11-13 PROCEDURE — 99213 OFFICE O/P EST LOW 20 MIN: CPT | Mod: 25 | Performed by: UROLOGY

## 2018-11-13 ASSESSMENT — PAIN SCALES - GENERAL: PAINLEVEL: NO PAIN (0)

## 2018-11-13 NOTE — MR AVS SNAPSHOT
"              After Visit Summary   2018    Milton Marmolejo    MRN: [id number]           Patient Information     Date Of Birth          1932        Visit Information        Provider Department      2018 8:30 AM Latih Langley MD [facility] [place]        Today's Diagnoses     Urinary frequency    -  1       Follow-ups after your visit        Follow-up notes from your care team     Return if symptoms worsen or fail to improve.      Who to contact     If you have questions or need follow up information about today's clinic visit or your schedule please contact [facility] [place] directly at [phone].  Normal or non-critical lab and imaging results will be communicated to you by MyChart, letter or phone within 4 business days after the clinic has received the results. If you do not hear from us within 7 days, please contact the clinic through [employer] or phone. If you have a critical or abnormal lab result, we will notify you by phone as soon as possible.  Submit refill requests through [employer] or call your pharmacy and they will forward the refill request to us. Please allow 3 business days for your refill to be completed.          Additional Information About Your Visit        MyChart Information     [employer] lets you send messages to your doctor, view your test results, renew your prescriptions, schedule appointments and more. To sign up, go to [email or web address] . Click on \"Log in\" on the left side of the screen, which will take you to the Welcome page. Then click on \"Sign up Now\" on the right side of the page.     You will be asked to enter the access code listed below, as well as some personal information. Please follow the directions to create your username and password.     Your access code is: [email or web address]  Expires: 2019  9:03 AM     Your access code will  in 90 days. If you need " "help or a new code, please call your [facility] or [phone].        Care EveryWhere ID     This is your Care EveryWhere ID. This could be used by other organizations to access your [place] medical records  [id number]        Your Vitals Were     Pulse Height Pulse Oximetry BMI (Body Mass Index)          78 1.778 m (5' 10\") 96% 25.25 kg/m2         Blood Pressure from Last 3 Encounters:   09/03/17 125/77   02/03/16 113/74    Weight from Last 3 Encounters:   11/13/18 79.8 kg (176 lb)   08/14/18 79.8 kg (176 lb)   09/03/17 79.8 kg (176 lb)              We Performed the Following     Bladder scan        Primary Care Provider Fax #    [facility] [phone] [address]  [facility] [address]        Equal Access to Services     PAUL SAMS : Hadii nahun gallego Sopb, waaxlori whitlock, qaybta kaalmada fouzia, true aaron . So [facility] [place] [facility] [phone].    ATENCIÓN: Si habla español, tiene a moore disposición servicios gratuitos de asistencia lingüística. Llame al [phone].    We comply with applicable federal civil rights laws and [place] laws. We do not discriminate on the basis of race, color, national origin, age, disability, sex, sexual orientation, or gender identity.            Thank you!     Thank you for choosing [facility] [place]  for your care. Our goal is always to provide you with excellent care. Hearing back from our patients is one way we can continue to improve our services. Please take a few minutes to complete the written survey that you may receive in the mail after your visit with us. Thank you!             Your Updated Medication List - Protect others around you: Learn how to safely use, store and throw away your medicines at www.disposemymeds.org.          This list is accurate as of 11/13/18  9:03 AM.  Always use your most recent med list.                   Brand Name Dispense Instructions " for use Diagnosis    ACETAMINOPHEN PO      Take 325 mg by mouth every 6 hours as needed for pain        ASPIRIN PO      Take 81 mg by mouth At Bedtime        donepezil 2.5 mg half-tab    ARICEPT     Take 2.5 mg by mouth At Bedtime        fluticasone 50 MCG/ACT spray    FLONASE     Spray 2 sprays into both nostrils daily as needed        multivitamin, therapeutic with minerals Tabs tablet      Take 1 tablet by mouth daily        SIMVASTATIN PO      Take 40 mg by mouth At Bedtime        tamsulosin 0.4 MG capsule    FLOMAX    30 capsule    Take 1 capsule (0.4 mg) by mouth daily    Benign prostatic hyperplasia with weak urinary stream       VASOTEC PO      Take 5 mg by mouth At Bedtime

## 2018-11-13 NOTE — NURSING NOTE
Pt stopped flomax due to dizziness.  Pt still having freq.  some days are better than others.  Pt just voided......... No ua at this time.  Pt sleeps well at nt.......... Sometimes up once a nt to void.  pvr by scanner=14mL  MIRELA Castorena, CMA

## 2018-11-13 NOTE — PROGRESS NOTES
"  CHIEF COMPLAINT   It was my pleasure to see Milton Marmolejo who is a 86 year old male for follow-up of BPH with urinary frequency.      HPI  Milton Marmolejo is a very pleasant 86 year old male who presents with a history of  BPH with urinary frequency. Marked weakening of stream. Most bothersome symptom is intermittent urinary frequency. Sometimes every 30 minutes - usually every 2 hours. Nocturia x 2. Tried tamsulosin but stopped after a few days as he was feeling dizzy. Reports that his urinary symptoms are not terribly bothersome at this point.    PHYSICAL EXAM  Patient is a 86 year old  male   Vitals: Pulse 78, height 1.778 m (5' 10\"), weight 79.8 kg (176 lb), SpO2 96 %.  General Appearance Adult: Body mass index is 25.25 kg/(m^2).  Alert, no acute distress, oriented  HENT: throat/mouth:normal, good dentition  Lungs: no respiratory distress, or pursed lip breathing  Heart: No obvious jugular venous distension present  Musculoskeltal: extremities normal, no peripheral edema  Skin: no suspicious lesions or rashes  Neuro: Alert, oriented, speech and mentation normal  Psych: affect and mood normal  Gait: Normal    ASSESSMENT and PLAN  86 year old male with BPh and urinary frequency. Tried tamsulosin but didn't tolerate this due to dizziness. Discuss alternative options of 5 SEE or TURP, but patient states his symptoms are not terribly bothersome and would prefer to observe at this time. He will follow-up with me on an as needed basis.    I spent over 10 minutes with the patient.  Over half this time was spent on counseling regarding BPH with urinary frequency.    Laith Langley MD  Urology  [facility]  Clinic Phone [phone]      "

## 2021-05-06 ENCOUNTER — APPOINTMENT (OUTPATIENT)
Dept: GENERAL RADIOLOGY | Facility: CLINIC | Age: 86
End: 2021-05-06
Attending: INTERNAL MEDICINE
Payer: MEDICARE

## 2021-05-06 ENCOUNTER — APPOINTMENT (OUTPATIENT)
Dept: MRI IMAGING | Facility: CLINIC | Age: 86
End: 2021-05-06
Attending: INTERNAL MEDICINE
Payer: MEDICARE

## 2021-05-06 ENCOUNTER — HOSPITAL ENCOUNTER (EMERGENCY)
Facility: CLINIC | Age: 86
Discharge: HOME | End: 2021-05-06
Attending: INTERNAL MEDICINE | Admitting: INTERNAL MEDICINE
Payer: MEDICARE

## 2021-05-06 VITALS
SYSTOLIC BLOOD PRESSURE: 150 MMHG | HEART RATE: 68 BPM | BODY MASS INDEX: 25.11 KG/M2 | WEIGHT: 175 LBS | OXYGEN SATURATION: 99 % | TEMPERATURE: 97.8 F | DIASTOLIC BLOOD PRESSURE: 94 MMHG | RESPIRATION RATE: 16 BRPM

## 2021-05-06 DIAGNOSIS — R42 LIGHTHEADEDNESS: ICD-10-CM

## 2021-05-06 LAB
ANION GAP SERPL CALCULATED.3IONS-SCNC: 5 MMOL/L (ref 3–14)
BASOPHILS # BLD AUTO: 0 10E9/L (ref 0–0.2)
BASOPHILS NFR BLD AUTO: 0.4 %
BUN SERPL-MCNC: 24 MG/DL (ref 7–30)
CALCIUM SERPL-MCNC: 8.5 MG/DL (ref 8.5–10.1)
CHLORIDE SERPL-SCNC: 106 MMOL/L (ref 94–109)
CO2 SERPL-SCNC: 30 MMOL/L (ref 20–32)
CREAT SERPL-MCNC: 1.31 MG/DL (ref 0.66–1.25)
DIFFERENTIAL METHOD BLD: ABNORMAL
EOSINOPHIL # BLD AUTO: 0 10E9/L (ref 0–0.7)
EOSINOPHIL NFR BLD AUTO: 0.9 %
ERYTHROCYTE [DISTWIDTH] IN BLOOD BY AUTOMATED COUNT: 12.9 % (ref 10–15)
GFR SERPL CREATININE-BSD FRML MDRD: 48 ML/MIN/{1.73_M2}
GLUCOSE SERPL-MCNC: 99 MG/DL (ref 70–99)
HCT VFR BLD AUTO: 39.4 % (ref 40–53)
HGB BLD-MCNC: 13 G/DL (ref 13.3–17.7)
IMM GRANULOCYTES # BLD: 0 10E9/L (ref 0–0.4)
IMM GRANULOCYTES NFR BLD: 0.4 %
LYMPHOCYTES # BLD AUTO: 0.7 10E9/L (ref 0.8–5.3)
LYMPHOCYTES NFR BLD AUTO: 16.6 %
MCH RBC QN AUTO: 32.6 PG (ref 26.5–33)
MCHC RBC AUTO-ENTMCNC: 33 G/DL (ref 31.5–36.5)
MCV RBC AUTO: 99 FL (ref 78–100)
MONOCYTES # BLD AUTO: 0.4 10E9/L (ref 0–1.3)
MONOCYTES NFR BLD AUTO: 9 %
NEUTROPHILS # BLD AUTO: 3.2 10E9/L (ref 1.6–8.3)
NEUTROPHILS NFR BLD AUTO: 72.7 %
NRBC # BLD AUTO: 0 10*3/UL
NRBC BLD AUTO-RTO: 0 /100
PLATELET # BLD AUTO: 198 10E9/L (ref 150–450)
POTASSIUM SERPL-SCNC: 3.9 MMOL/L (ref 3.4–5.3)
RBC # BLD AUTO: 3.99 10E12/L (ref 4.4–5.9)
SODIUM SERPL-SCNC: 141 MMOL/L (ref 133–144)
TROPONIN I SERPL-MCNC: <0.015 UG/L (ref 0–0.04)
WBC # BLD AUTO: 4.5 10E9/L (ref 4–11)

## 2021-05-06 PROCEDURE — 71046 X-RAY EXAM CHEST 2 VIEWS: CPT

## 2021-05-06 PROCEDURE — 85025 COMPLETE CBC W/AUTO DIFF WBC: CPT | Performed by: INTERNAL MEDICINE

## 2021-05-06 PROCEDURE — 99285 EMERGENCY DEPT VISIT HI MDM: CPT | Mod: 25

## 2021-05-06 PROCEDURE — 70551 MRI BRAIN STEM W/O DYE: CPT | Mod: MG

## 2021-05-06 PROCEDURE — 84484 ASSAY OF TROPONIN QUANT: CPT | Performed by: INTERNAL MEDICINE

## 2021-05-06 PROCEDURE — 80048 BASIC METABOLIC PNL TOTAL CA: CPT | Performed by: INTERNAL MEDICINE

## 2021-05-06 PROCEDURE — 93005 ELECTROCARDIOGRAM TRACING: CPT

## 2021-05-06 ASSESSMENT — ENCOUNTER SYMPTOMS
FEVER: 0
CONFUSION: 0
DIZZINESS: 1
PALPITATIONS: 0
SPEECH DIFFICULTY: 0
COUGH: 0
MYALGIAS: 0
SHORTNESS OF BREATH: 0
HEADACHES: 1
DIARRHEA: 0

## 2021-05-06 NOTE — ED TRIAGE NOTES
Pt presents to ED from urgent care. Pt went to urgent care because he had a dizzy spell and felt like he was going to pass out at about 2pm today. Pt states that he feels ok now. Pt referred to ED because of his high BP and for further cardiac workup. Pt denies having any chest pain today.  ABC Intact. A/O x4.     Pt did not take his BP meds today.

## 2021-05-06 NOTE — ED PROVIDER NOTES
History   Chief Complaint:  Hypertension      HPI   Milton Marmolejo is a 88 year old male with history of hypertension and TIA who presents with hypertension. The patient states that he stood up from using the toilet and felt dizzy and near syncopal around 1400 today. He then went to Urgent Care and they noted high blood pressure and possible EKG changes so they referred him here for further cardiac evaluation. Patient states he feels pretty good now but adds that he had a slight headache this morning. He also mentions that he ate a large hamburger for lunch. Denies speech difficulty, confusion, vision changes, chest pain, shortness of breath, palpitations, diarrhea, fever, body aches, cough, or trouble ambulating. He is unsure if he had black or bloody stool. Patient has history of TIA and family is concerned for another. Patient notes that he forgot to take his blood pressure medication this morning.     Review of Systems   Constitutional: Negative for fever.   Eyes: Negative for visual disturbance.   Respiratory: Negative for cough and shortness of breath.    Cardiovascular: Negative for chest pain and palpitations.   Gastrointestinal: Negative for diarrhea.   Musculoskeletal: Negative for gait problem and myalgias.   Neurological: Positive for dizziness and headaches. Negative for speech difficulty.   Psychiatric/Behavioral: Negative for confusion.       Allergies:  No Known Allergies    Medications:  Aricept  Vasotec  Flonase  Simvastatin    Past Medical History:    Hypertension   Mumps  Transient ischemic attack  Benign prostatic hyperplasia    Social History:  Presents with his daughter      Physical Exam     Patient Vitals for the past 24 hrs:   BP Temp Pulse Resp SpO2 Weight   05/06/21 1916 -- -- -- -- 96 % --   05/06/21 1915 (!) 179/101 -- 71 -- -- --   05/06/21 1805 (!) 162/97 -- 71 16 98 % --   05/06/21 1800 (!) 162/97 -- 69 10 98 % --   05/06/21 1745 (!) 183/102 -- 70 -- -- --   05/06/21  1744 -- -- 73 11 98 % --   05/06/21 1730 (!) 172/102 -- 69 13 98 % --   05/06/21 1715 (!) 182/111 -- 71 -- 98 % --   05/06/21 1714 -- -- 75 16 98 % --   05/06/21 1701 -- -- 70 15 98 % --   05/06/21 1700 (!) 171/101 -- 67 -- 98 % --   05/06/21 1646 (!) 172/106 -- 74 20 99 % --   05/06/21 1640 -- -- -- -- -- 79.4 kg (175 lb)   05/06/21 1639 (!) 181/109 -- 72 27 99 % --   05/06/21 1611 (!) 189/126 97.8  F (36.6  C) 68 16 100 % --       Physical Exam  Constitutional:       Comments: Pleasant and cooperative   Eyes:      Conjunctiva/sclera: Conjunctivae normal.   Neck:      Musculoskeletal: Neck supple.   Cardiovascular:      Rate and Rhythm: Normal rate and regular rhythm.      Heart sounds: Normal heart sounds.   Pulmonary:      Effort: Pulmonary effort is normal.      Breath sounds: Normal breath sounds.   Abdominal:      General: Bowel sounds are normal. There is no distension.      Palpations: Abdomen is soft.      Tenderness: There is no abdominal tenderness. There is no guarding or rebound.   Musculoskeletal: Normal range of motion.   Skin:     General: Skin is warm and dry.   Neurological:      Mental Status: He is alert.         Emergency Department Course   ECG  ECG taken at 1629, ECG read at 1655  Hypertension    Rate 72 bpm. MI interval 176 ms. QRS duration 88 ms. QT/QTc 396/433 ms. P-R-T axes 64 35 82. Normal sinus rhythm.      Imaging:  MR Brain w/o Contrast  1.  No acute infarct, intracranial hemorrhage, intracranial mass.   2.  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   3.  Severe burden presumed sequela of chronic small vessel ischemic changes involving the cerebral white matter.   4.  Mild to moderate cerebral and cerebellar volume loss.  As read by Radiology.      XR Chest 2 Views  Normal heart size and pulmonary vascularity. Calcified granuloma right lower lung laterally. No infiltrates, effusions or pneumothorax. Tortuous  and calcified thoracic aorta. Minimal degenerative changes in the spine.  As read by Radiology.      Laboratory:   CBC: WBC 4.5, HGB 13.0 (L),   BMP: creatinine 1.31 (H), GFR 48 (L) o/w WNL     Troponin (Collected 1646): <0.015     Emergency Department Course:    Reviewed:  I reviewed nursing notes, vitals and past medical history    Assessments:  1648 I obtained history and examined the patient as noted above.   1959 I rechecked the patient and explained findings.     Disposition:  The patient was discharged to home.     Impression & Plan     CMS Diagnoses: None    Medical Decision Making:    Milton Marmolejo is a 88 year old male brought to the emergency department by his daughter after an episode of lightheadedness at home.  It was difficult to get a clear description of this from him and other staff said he gave a somewhat varying description.  He says that his wife was concerned that he had a stroke.  It was unclear to me what led her to be worried about this but I was concerned that there was a finding that Milton was not able to share with me.  We proceeded to MRI which fortunately shows no evidence of acute or subacute stroke.  Other evaluation is unremarkable with essentially normal or unchanged labs, negative troponin.  There was concern about his EKG from urgent care but here it appears unchanged from previous.  He is comfortable with discharge home which I think is reasonable.  I have asked him to return if he has further episodes of lightheadedness or other symptoms of illness.    Diagnosis:    ICD-10-CM    1. Lightheadedness  R42        Discharge Medications:  New Prescriptions    No medications on file       Scribe Disclosure:  I, Nathalie Smith, am serving as a scribe at 4:31 PM on 5/6/2021 to document services personally performed by Siena Poon MD based on my observations and the provider's statements to me.             Siena Poon MD  05/06/21 2007

## 2021-05-06 NOTE — ED TRIAGE NOTES
Pt family member adds that EKG at urgent care showed some changes since his last EKG and that pt has hx of TIAs.

## 2021-05-07 LAB — INTERPRETATION ECG - MUSE: NORMAL

## 2021-06-30 DIAGNOSIS — R35.0 URINARY FREQUENCY: Primary | ICD-10-CM

## 2021-07-21 ENCOUNTER — HOSPITAL ENCOUNTER (EMERGENCY)
Facility: CLINIC | Age: 86
Discharge: HOME | End: 2021-07-21
Attending: EMERGENCY MEDICINE | Admitting: EMERGENCY MEDICINE
Payer: MEDICARE

## 2021-07-21 VITALS
SYSTOLIC BLOOD PRESSURE: 163 MMHG | TEMPERATURE: 97.7 F | OXYGEN SATURATION: 100 % | HEART RATE: 77 BPM | DIASTOLIC BLOOD PRESSURE: 108 MMHG | HEIGHT: 70 IN | WEIGHT: 170 LBS | BODY MASS INDEX: 24.34 KG/M2 | RESPIRATION RATE: 12 BRPM

## 2021-07-21 DIAGNOSIS — I10 BENIGN ESSENTIAL HYPERTENSION: ICD-10-CM

## 2021-07-21 DIAGNOSIS — R42 DIZZINESS: ICD-10-CM

## 2021-07-21 DIAGNOSIS — R11.11 NON-INTRACTABLE VOMITING WITHOUT NAUSEA, UNSPECIFIED VOMITING TYPE: ICD-10-CM

## 2021-07-21 LAB
ALBUMIN UR-MCNC: NEGATIVE MG/DL
ANION GAP SERPL CALCULATED.3IONS-SCNC: 3 MMOL/L (ref 3–14)
APPEARANCE UR: CLEAR
BASOPHILS # BLD AUTO: 0 10E3/UL (ref 0–0.2)
BASOPHILS NFR BLD AUTO: 1 %
BILIRUB UR QL STRIP: NEGATIVE
BUN SERPL-MCNC: 19 MG/DL (ref 7–30)
CALCIUM SERPL-MCNC: 9.1 MG/DL (ref 8.5–10.1)
CHLORIDE BLD-SCNC: 109 MMOL/L (ref 94–109)
CO2 SERPL-SCNC: 29 MMOL/L (ref 20–32)
COLOR UR AUTO: NORMAL
CREAT SERPL-MCNC: 1.23 MG/DL (ref 0.66–1.25)
EOSINOPHIL # BLD AUTO: 0 10E3/UL (ref 0–0.7)
EOSINOPHIL NFR BLD AUTO: 0 %
ERYTHROCYTE [DISTWIDTH] IN BLOOD BY AUTOMATED COUNT: 12.8 % (ref 10–15)
GFR SERPL CREATININE-BSD FRML MDRD: 52 ML/MIN/1.73M2
GLUCOSE BLD-MCNC: 99 MG/DL (ref 70–99)
GLUCOSE UR STRIP-MCNC: NEGATIVE MG/DL
HCT VFR BLD AUTO: 40.2 % (ref 40–53)
HGB BLD-MCNC: 13 G/DL (ref 13.3–17.7)
HGB UR QL STRIP: NEGATIVE
HOLD SPECIMEN: NORMAL
IMM GRANULOCYTES # BLD: 0 10E3/UL
IMM GRANULOCYTES NFR BLD: 0 %
KETONES UR STRIP-MCNC: NEGATIVE MG/DL
LEUKOCYTE ESTERASE UR QL STRIP: NEGATIVE
LYMPHOCYTES # BLD AUTO: 1 10E3/UL (ref 0.8–5.3)
LYMPHOCYTES NFR BLD AUTO: 16 %
MCH RBC QN AUTO: 31.6 PG (ref 26.5–33)
MCHC RBC AUTO-ENTMCNC: 32.3 G/DL (ref 31.5–36.5)
MCV RBC AUTO: 98 FL (ref 78–100)
MONOCYTES # BLD AUTO: 0.4 10E3/UL (ref 0–1.3)
MONOCYTES NFR BLD AUTO: 7 %
NEUTROPHILS # BLD AUTO: 4.5 10E3/UL (ref 1.6–8.3)
NEUTROPHILS NFR BLD AUTO: 76 %
NITRATE UR QL: NEGATIVE
NRBC # BLD AUTO: 0 10E3/UL
NRBC BLD AUTO-RTO: 0 /100
PH UR STRIP: 7 [PH] (ref 5–7)
PLATELET # BLD AUTO: 220 10E3/UL (ref 150–450)
POTASSIUM BLD-SCNC: 4 MMOL/L (ref 3.4–5.3)
RBC # BLD AUTO: 4.12 10E6/UL (ref 4.4–5.9)
RBC URINE: 0 /HPF
SODIUM SERPL-SCNC: 141 MMOL/L (ref 133–144)
SP GR UR STRIP: 1 (ref 1–1.03)
TROPONIN I SERPL-MCNC: <0.015 UG/L (ref 0–0.04)
UROBILINOGEN UR STRIP-MCNC: NORMAL MG/DL
WBC # BLD AUTO: 5.9 10E3/UL (ref 4–11)
WBC URINE: <1 /HPF

## 2021-07-21 PROCEDURE — 96361 HYDRATE IV INFUSION ADD-ON: CPT

## 2021-07-21 PROCEDURE — 84484 ASSAY OF TROPONIN QUANT: CPT | Performed by: EMERGENCY MEDICINE

## 2021-07-21 PROCEDURE — 99284 EMERGENCY DEPT VISIT MOD MDM: CPT | Mod: 25

## 2021-07-21 PROCEDURE — 36592 COLLECT BLOOD FROM PICC: CPT | Performed by: EMERGENCY MEDICINE

## 2021-07-21 PROCEDURE — 81001 URINALYSIS AUTO W/SCOPE: CPT | Performed by: EMERGENCY MEDICINE

## 2021-07-21 PROCEDURE — 258N000003 HC RX IP 258 OP 636: Performed by: EMERGENCY MEDICINE

## 2021-07-21 PROCEDURE — 85004 AUTOMATED DIFF WBC COUNT: CPT | Performed by: EMERGENCY MEDICINE

## 2021-07-21 PROCEDURE — 93005 ELECTROCARDIOGRAM TRACING: CPT

## 2021-07-21 PROCEDURE — 36415 COLL VENOUS BLD VENIPUNCTURE: CPT | Performed by: EMERGENCY MEDICINE

## 2021-07-21 PROCEDURE — 96360 HYDRATION IV INFUSION INIT: CPT

## 2021-07-21 PROCEDURE — 82374 ASSAY BLOOD CARBON DIOXIDE: CPT | Performed by: EMERGENCY MEDICINE

## 2021-07-21 RX ADMIN — SODIUM CHLORIDE 500 ML: 9 INJECTION, SOLUTION INTRAVENOUS at 18:25

## 2021-07-21 RX ADMIN — SODIUM CHLORIDE 500 ML: 9 INJECTION, SOLUTION INTRAVENOUS at 16:44

## 2021-07-21 ASSESSMENT — ENCOUNTER SYMPTOMS
FEVER: 0
COUGH: 0
FREQUENCY: 1
RHINORRHEA: 1
VOMITING: 1
DIZZINESS: 1

## 2021-07-21 ASSESSMENT — MIFFLIN-ST. JEOR: SCORE: 1447.36

## 2021-07-21 NOTE — ED PROVIDER NOTES
"  History   Chief Complaint:  Dizziness/lightheadedness     HPI   Milton Marmolejo is a 88 year old male with history of hypertension and BPH who presents with dizziness/lightheadedness.  He was seen on 05/06/21 for lightheadedness after getting up too quickly from the toilet. MRI and basic labs were unremarkable and was sent home. He reports ceasing use of Donepezil on 06/29/21 and has in increase in his Vasotec to 10 mg on 06/29/21 br Dr. Mónica Santiago as he has been having higher than normal blood pressures. Patient was measuring his blood pressure at home and recorded a blood pressure in the 190's systolic. He called his primary care who advised him to come to the emergency department.  Here, he reports of feeling dizzy after finishing his meal yesterday and had about of emesis in the kitchen sink. He felt like her could have passed out but did not. He felt somewhat better afterwards but continued to feel \"whoozy\". He does not feel off balance but feels dizzy. He went to the grocery store this morning at 0630 and felt unwell after returning home. He had another bout of emesis while at home today. He denies any current pain. He denies any fevers, coughs, or cold. He reports of having chronic rhinorrhea. He reports in urinary frequency every 5 minutes, but this has been a chronic issue and has been referred to urology for this. No one at home has been sick or having similar symptoms at home. He is vaccinated for COVID-19.    MR Brain w/o Contrast  1.  No acute infarct, intracranial hemorrhage, intracranial mass.   2.  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   3.  Severe burden presumed sequela of chronic small vessel ischemic changes involving the cerebral white matter.   4.  Mild to moderate cerebral and cerebellar volume loss.  As read by Radiology.      Review of Systems   Constitutional: Negative for fever.   HENT: Positive for " "rhinorrhea.    Respiratory: Negative for cough.    Gastrointestinal: Positive for vomiting.   Genitourinary: Positive for frequency.   Neurological: Positive for dizziness. Negative for syncope.   All other systems reviewed and are negative.      Allergies:  No known drug allergies    Medications:  Aspirin   Vasotec  Simvastatin   Flomax     Past Medical History:    Hypertension   TIA   Mumps  BPH    Social History:  Arrives to the emergency department with his wife and neighbor.     Physical Exam     Patient Vitals for the past 24 hrs:   BP Temp Temp src Pulse Resp SpO2 Height Weight   07/21/21 1915 (!) 163/108 -- -- 77 12 100 % -- --   07/21/21 1800 (!) 166/103 -- -- 70 12 97 % -- --   07/21/21 1700 (!) 171/100 -- -- 68 17 96 % -- --   07/21/21 1615 (!) 148/95 -- -- 70 16 99 % -- --   07/21/21 1600 (!) 151/96 -- -- 70 23 100 % -- --   07/21/21 1530 (!) 174/103 -- -- 74 13 100 % -- --   07/21/21 1504 (!) 195/115 97.7  F (36.5  C) Temporal 89 18 97 % 1.778 m (5' 10\") 77.1 kg (170 lb)       Physical Exam    Nursing note and vitals reviewed.  Constitutional: Cooperative.   HENT:   Mouth/Throat: Moist mucous membranes.   Eyes: EOMI, nonicteric sclera  Cardiovascular: Normal rate, regular rhythm, no murmurs, rubs, or gallops  Pulmonary/Chest: Effort normal and breath sounds normal. No respiratory distress. No wheezes. No rales.   Abdominal: Soft. Nontender, nondistended, no guarding or rigidity.   Musculoskeletal: Normal range of motion.   Neurological: Alert. Moves all extremities spontaneously. Face symmetric. Equal strength bilateral upper/lower extremities.   Skin: Skin is warm and dry. No rash noted.   Psychiatric: Normal mood and affect.     Emergency Department Course   ECG  ECG taken at 1513, ECG read at 1643  Sinus rhythm with premature supraventricular complexes   Otherwise normal ECG   No change as compared to prior, dated 9/2/17 & 05/06/21.  Rate 73 bpm. NM interval 170 ms. QRS duration 88 ms. QT/QTc 384/423 " ms. P-R-T axes 80 57 76.     Laboratory:  CBC: WBC 5.9, HGB 13.0 (L),    BMP: GFR 52 (L), o/w WNL (Creatinine: 1.23)    Troponin: <0.015    UA with Microscopic: AWNL    Emergency Department Course:    Reviewed:  I reviewed nursing notes, vitals, past medical history and care everywhere    Assessments:  1555 I obtained history and examined the patient as noted above.   1909 I rechecked the patient and explained findings to family.     Interventions:  1644 0.9% NaCl bolus 500 mL IV  1825 0.9% NaCl bolus 500 mL IV    Disposition:  The patient was discharged to home.     Impression & Plan     Medical Decision Making:  Pt presents with CC dizziness that appears to be lightheadedness. No vertigo. No ataxia. Pt had previous ED visit with negative MRI. Pressures here are high, but not significantly so, and not compared to recent visits. Doubt BP is cause of symptoms. Pt also with 2 episodes of vomiting - yesterday and today, but is eating and drinking normally. Doubt viral illness. Symptoms correspond to his feeling of lightheadedness. Question if this could be vasovagal in nature. Labs/urine unremarkable. He feels improved after IVF here. Pt states he is outside every morning watering plants. It certainly has been hot and humid recently. Possibility of dehydration as well. Orthostatics negative here. In absence of emergent etiology and 2 thorough negative workups, do not believe pt is currently suffering from emergent medical process. Encouraged him to follow-up with his primary care provider to discuss symptoms as well as for further blood pressure medication adjustment. He and his daughter are in agreement with this plan. Pt discharged in stable condition.       Diagnosis:    ICD-10-CM    1. Dizziness  R42    2. Non-intractable vomiting without nausea, unspecified vomiting type  R11.11    3. Benign essential hypertension  I10      Scribe Disclosure:  I, Master Pelaez, am serving as a scribe at 3:16 PM on  7/21/2021 to document services personally performed by Nael Clay MD based on my observations and the provider's statements to me.              Nael Clay MD  07/22/21 0818

## 2021-07-21 NOTE — ED TRIAGE NOTES
Pt arrives to Ed with HTN BP in 190's at home. He reports he did have increase in vasotec and started on a new BP med. Pt has felt dizzy and vomited once yesterday and once today. A/ox 4. /115. ABC's intact.

## 2021-07-22 LAB
ATRIAL RATE - MUSE: 73 BPM
DIASTOLIC BLOOD PRESSURE - MUSE: NORMAL MMHG
INTERPRETATION ECG - MUSE: NORMAL
P AXIS - MUSE: 80 DEGREES
PR INTERVAL - MUSE: 170 MS
QRS DURATION - MUSE: 88 MS
QT - MUSE: 384 MS
QTC - MUSE: 423 MS
R AXIS - MUSE: 57 DEGREES
SYSTOLIC BLOOD PRESSURE - MUSE: NORMAL MMHG
T AXIS - MUSE: 76 DEGREES
VENTRICULAR RATE- MUSE: 73 BPM

## 2021-07-22 NOTE — DISCHARGE INSTRUCTIONS
Discharge Instructions  Dizziness (Lightheaded)  Today you were seen for dizziness.  Dizziness can be caused by many things and it can be very difficult to determine the cause of dizziness.  At this time, your provider has found no signs that your dizziness is due to a serious or life-threatening condition. However, sometimes there is a serious problem that does not show up right away, and it is important for you to follow up with your regular provider as instructed.  Generally, every Emergency Department visit should have a follow-up clinic visit with either a primary or a specialty clinic/provider. Please follow-up as instructed by your emergency provider today.      Return to the Emergency Department if:    You pass out (fainting or falling out), especially during exercise.    You develop chest pain, chest pressure or difficulty breathing.  Your feel an irregular heartbeat.  You have excessive vaginal bleeding, or blood in your stool or vomit (throw up).  You have a high fever.  Your symptoms get worse or more frequent.    If when you begin to feel dizzy or lightheaded, it is important to sit down or lay down immediately to prevent injury from falling.  If you were given a prescription for medicine here today, be sure to read all of the information (including the package insert) that comes with your prescription.  This will include important information about the medicine, its side effects, and any warnings that you need to know about.  The pharmacist who fills the prescription can provide more information and answer questions you may have about the medicine.  If you have questions or concerns that the pharmacist cannot address, please call or return to the Emergency Department.   Remember that you can always come back to the Emergency Department if you are not able to see your regular provider in the amount of time listed above, if you get any new symptoms, or if there is anything that worries you.    Discharge  Instructions  Hypertension - High Blood Pressure    During you visit to the Emergency Department, your blood pressure was higher than the recommended blood pressure.  This may be related to stress, pain, medication or other temporary conditions. In these cases, your blood pressure may return to normal on its own. If you have a history of high blood pressure, you may need to have your provider adjust your medications. Sometimes, your high measurement here may indicate that you have developed high blood pressure that will stay high unless it is treated. As a general rule, high blood pressure causes problems over years rather than days, weeks, or months. So, while it is important to treat blood pressure, it is rarely important to treat blood pressure immediately. Occasionally we will begin a medication in the Emergency Department; more often we will recommend close follow-up for medications with a primary doctor/clinic.    Generally, every Emergency Department visit should have a follow-up clinic visit with either a primary or a specialty clinic/provider. Please follow-up as instructed by your emergency provider today.    Return to the Emergency Department if you start to have:  A severe headache.  Chest pain.  Shortness of breath.  Weakness or numbness that affects one part of the body.  Confusion.  Vision changes.  Significant swelling of legs and/or eyes.  A reaction to any medication started in the Emergency Department.    What can I do to help myself?  Avoid alcohol.  Take any blood pressure medicine that you are prescribed.  Get a good night s sleep.  Lower your salt intake.  Exercise.  Lose weight.  Manage stress.  See your doctor regularly    If blood pressure medication was started in the Emergency Department:  The medicine may not have an immediate effect. The body and brain determine what blood pressure you have. The medicine s job is to retrain the body s  thermostat  to a lower blood pressure.  You will  need to follow up with your provider to see how this medicine is working for you.  If you were given a prescription for medicine here today, be sure to read all of the information (including the package insert) that comes with your prescription.  This will include important information about the medicine, its side effects, and any warnings that you need to know about.  The pharmacist who fills the prescription can provide more information and answer questions you may have about the medicine.  If you have questions or concerns that the pharmacist cannot address, please call or return to the Emergency Department.   Remember that you can always come back to the Emergency Department if you are not able to see your regular provider in the amount of time listed above, if you get any new symptoms, or if there is anything that worries you.

## 2022-10-07 ENCOUNTER — TRANSFERRED RECORDS (OUTPATIENT)
Dept: HEALTH INFORMATION MANAGEMENT | Facility: CLINIC | Age: 87
End: 2022-10-07

## 2022-10-11 ENCOUNTER — TELEPHONE (OUTPATIENT)
Dept: OPHTHALMOLOGY | Facility: CLINIC | Age: 87
End: 2022-10-11

## 2022-10-11 ENCOUNTER — OFFICE VISIT (OUTPATIENT)
Dept: OPHTHALMOLOGY | Facility: CLINIC | Age: 87
End: 2022-10-11
Attending: OPHTHALMOLOGY
Payer: MEDICARE

## 2022-10-11 DIAGNOSIS — H35.3221 EXUDATIVE AGE-RELATED MACULAR DEGENERATION OF LEFT EYE WITH ACTIVE CHOROIDAL NEOVASCULARIZATION (H): Primary | ICD-10-CM

## 2022-10-11 PROCEDURE — 99203 OFFICE O/P NEW LOW 30 MIN: CPT | Mod: GC | Performed by: OPHTHALMOLOGY

## 2022-10-11 PROCEDURE — G0463 HOSPITAL OUTPT CLINIC VISIT: HCPCS

## 2022-10-11 RX ORDER — ENALAPRIL MALEATE 5 MG/1
10 TABLET ORAL DAILY
COMMUNITY
Start: 2022-07-20

## 2022-10-11 RX ORDER — FINASTERIDE 5 MG/1
TABLET, FILM COATED ORAL
COMMUNITY
Start: 2022-09-06 | End: 2024-02-26

## 2022-10-11 RX ORDER — AMLODIPINE BESYLATE 2.5 MG/1
2.5 TABLET ORAL
COMMUNITY
Start: 2022-03-07

## 2022-10-11 RX ORDER — SIMVASTATIN 20 MG
20 TABLET ORAL AT BEDTIME
COMMUNITY
Start: 2022-08-30

## 2022-10-11 ASSESSMENT — CONF VISUAL FIELD
OS_SUPERIOR_TEMPORAL_RESTRICTION: 0
OS_INFERIOR_NASAL_RESTRICTION: 0
OD_SUPERIOR_NASAL_RESTRICTION: 2
OS_SUPERIOR_NASAL_RESTRICTION: 0
OD_SUPERIOR_TEMPORAL_RESTRICTION: 2
OS_INFERIOR_TEMPORAL_RESTRICTION: 0
OD_INFERIOR_TEMPORAL_RESTRICTION: 2
OS_NORMAL: 1
OD_INFERIOR_NASAL_RESTRICTION: 2

## 2022-10-11 ASSESSMENT — SLIT LAMP EXAM - LIDS
COMMENTS: NORMAL
COMMENTS: NORMAL

## 2022-10-11 ASSESSMENT — CUP TO DISC RATIO
OS_RATIO: 0.35
OD_RATIO: 0.35

## 2022-10-11 ASSESSMENT — TONOMETRY
OD_IOP_MMHG: 16
OS_IOP_MMHG: 15
IOP_METHOD: TONOPEN

## 2022-10-11 ASSESSMENT — EXTERNAL EXAM - RIGHT EYE: OD_EXAM: NORMAL

## 2022-10-11 ASSESSMENT — VISUAL ACUITY
METHOD: SNELLEN - LINEAR
OS_CC: 20/70

## 2022-10-11 ASSESSMENT — EXTERNAL EXAM - LEFT EYE: OS_EXAM: NORMAL

## 2022-10-11 NOTE — TELEPHONE ENCOUNTER
S/p left eye intravitreal injection last Friday October 7th with Dr. Chavira.    [facility] on call for Dr. Chavira's office    Daughter calling with pt concern of left eye decrease in vision after waking up today    Vision by history last visit-- right eye Count Fingers and left eye 20/200    Pt scheduled with Dr. Morris at 1015 today at [facility] location    Daughter aware of date/time/location at [facility] and has main clinic number    Bandar Squires RN 9:17 AM 10/11/22

## 2022-10-11 NOTE — PROGRESS NOTES
CC -  wet AMD    INTERVAL HISTORY - Initial visit with me. Vision loss OS after Eylea injection by DARION on 10/7/22, no pain, uncertain if vision loss is still present or was present befor injection, h/o dementia  Patient noted blurry vision this morning. Patient denies any pain, redness, or flashes or floaters.       PMH -   Milton Marmolejo is a 89 year old  patient with wet aMD OU seen by DARION, visit at [facility] d/t ?vision loss OS after injectino 10/7/22         PAST OCULAR SURGERY  CE/IOL OU 1990s      RETINAL IMAGING:  None today      ASSESSMENT & PLAN    # Wet AMD OD   - per patient no h/o treatment   - end stage    # Wet AMD OS   - given multiple injections by DARION   - last njection 10/7/22 Eylea by DARION   - ?vision worse subjectively   - no inflammation, no infection likely   - f/u with DARION as scheduled in 2 months   - advised warning signs of endophthalmitis, to call immediately if present      # PVD OU          return to clinic:with DARION as scheduled in 2 months    Jo Puente MD  PGY-1     ATTESTATION     Attending Physician Attestation:      Complete documentation of historical and exam elements from today's encounter can be found in the full encounter summary report (not reduplicated in this progress note).  I personally obtained the chief complaint(s) and history of present illness.  I confirmed and edited as necessary the review of systems, past medical/surgical history, family history, social history, and examination findings as documented by others; and I examined the patient myself.  I personally reviewed the relevant tests, images, and reports as documented above.  I formulated and edited as necessary the assessment and plan and discussed the findings and management plan with the patient and family    Sheeba Morris MD, PhD  , Vitreoretinal Surgery  Department of Ophthalmology  [facility]

## 2024-02-26 ENCOUNTER — APPOINTMENT (OUTPATIENT)
Dept: CT IMAGING | Facility: CLINIC | Age: 89
DRG: 536 | End: 2024-02-26
Attending: EMERGENCY MEDICINE
Payer: MEDICARE

## 2024-02-26 ENCOUNTER — HOSPITAL ENCOUNTER (INPATIENT)
Facility: CLINIC | Age: 89
LOS: 3 days | Discharge: NURSING, REHAB OR LONG-TERM CARE | DRG: 536 | End: 2024-02-29
Attending: EMERGENCY MEDICINE | Admitting: HOSPITALIST
Payer: MEDICARE

## 2024-02-26 ENCOUNTER — APPOINTMENT (OUTPATIENT)
Dept: GENERAL RADIOLOGY | Facility: CLINIC | Age: 89
DRG: 536 | End: 2024-02-26
Attending: EMERGENCY MEDICINE
Payer: MEDICARE

## 2024-02-26 DIAGNOSIS — S32.10XA CLOSED FRACTURE OF SACRUM, UNSPECIFIED PORTION OF SACRUM, INITIAL ENCOUNTER (H): ICD-10-CM

## 2024-02-26 DIAGNOSIS — S32.592A PUBIC RAMUS FRACTURE, LEFT, CLOSED, INITIAL ENCOUNTER (H): ICD-10-CM
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
## 2024-02-26 DIAGNOSIS — R10.32 LEFT GROIN PAIN: ICD-10-CM

## 2024-02-26 LAB
ANION GAP SERPL CALCULATED.3IONS-SCNC: 10 MMOL/L (ref 7–15)
BASOPHILS # BLD AUTO: 0 10E3/UL (ref 0–0.2)
BASOPHILS NFR BLD AUTO: 0 %
BUN SERPL-MCNC: 21.2 MG/DL (ref 8–23)
CALCIUM SERPL-MCNC: 8.7 MG/DL (ref 8.2–9.6)
CHLORIDE SERPL-SCNC: 103 MMOL/L (ref 98–107)
CREAT SERPL-MCNC: 1.41 MG/DL (ref 0.67–1.17)
DEPRECATED HCO3 PLAS-SCNC: 23 MMOL/L (ref 22–29)
EGFRCR SERPLBLD CKD-EPI 2021: 47 ML/MIN/1.73M2
EOSINOPHIL # BLD AUTO: 0 10E3/UL (ref 0–0.7)
EOSINOPHIL NFR BLD AUTO: 0 %
ERYTHROCYTE [DISTWIDTH] IN BLOOD BY AUTOMATED COUNT: 13 % (ref 10–15)
GLUCOSE SERPL-MCNC: 109 MG/DL (ref 70–99)
HCT VFR BLD AUTO: 34.4 % (ref 40–53)
HGB BLD-MCNC: 11.3 G/DL (ref 13.3–17.7)
IMM GRANULOCYTES # BLD: 0 10E3/UL
IMM GRANULOCYTES NFR BLD: 0 %
LYMPHOCYTES # BLD AUTO: 1 10E3/UL (ref 0.8–5.3)
LYMPHOCYTES NFR BLD AUTO: 10 %
MCH RBC QN AUTO: 32.6 PG (ref 26.5–33)
MCHC RBC AUTO-ENTMCNC: 32.8 G/DL (ref 31.5–36.5)
MCV RBC AUTO: 99 FL (ref 78–100)
MONOCYTES # BLD AUTO: 0.6 10E3/UL (ref 0–1.3)
MONOCYTES NFR BLD AUTO: 7 %
NEUTROPHILS # BLD AUTO: 8 10E3/UL (ref 1.6–8.3)
NEUTROPHILS NFR BLD AUTO: 83 %
NRBC # BLD AUTO: 0 10E3/UL
NRBC BLD AUTO-RTO: 0 /100
PLATELET # BLD AUTO: 179 10E3/UL (ref 150–450)
POTASSIUM SERPL-SCNC: 3.9 MMOL/L (ref 3.4–5.3)
RBC # BLD AUTO: 3.47 10E6/UL (ref 4.4–5.9)
SODIUM SERPL-SCNC: 136 MMOL/L (ref 135–145)
WBC # BLD AUTO: 9.7 10E3/UL (ref 4–11)

## 2024-02-26 PROCEDURE — 36415 COLL VENOUS BLD VENIPUNCTURE: CPT | Performed by: EMERGENCY MEDICINE

## 2024-02-26 PROCEDURE — 250N000013 HC RX MED GY IP 250 OP 250 PS 637: Performed by: PHYSICIAN ASSISTANT

## 2024-02-26 PROCEDURE — 80048 BASIC METABOLIC PNL TOTAL CA: CPT | Performed by: EMERGENCY MEDICINE

## 2024-02-26 PROCEDURE — 99285 EMERGENCY DEPT VISIT HI MDM: CPT | Mod: 25

## 2024-02-26 PROCEDURE — 72192 CT PELVIS W/O DYE: CPT | Mod: MG

## 2024-02-26 PROCEDURE — 99222 1ST HOSP IP/OBS MODERATE 55: CPT | Mod: AI | Performed by: PHYSICIAN ASSISTANT

## 2024-02-26 PROCEDURE — 120N000001 HC R&B MED SURG/OB

## 2024-02-26 PROCEDURE — 250N000013 HC RX MED GY IP 250 OP 250 PS 637: Performed by: EMERGENCY MEDICINE

## 2024-02-26 PROCEDURE — 73502 X-RAY EXAM HIP UNI 2-3 VIEWS: CPT

## 2024-02-26 PROCEDURE — 93005 ELECTROCARDIOGRAM TRACING: CPT

## 2024-02-26 PROCEDURE — 85025 COMPLETE CBC W/AUTO DIFF WBC: CPT | Performed by: EMERGENCY MEDICINE

## 2024-02-26 RX ORDER — HYDROXYZINE HYDROCHLORIDE 10 MG/1
10 TABLET, FILM COATED ORAL 3 TIMES DAILY PRN
Status: DISCONTINUED | OUTPATIENT
Start: 2024-02-26 | End: 2024-02-26

## 2024-02-26 RX ORDER — ACETAMINOPHEN 325 MG/1
325 TABLET ORAL EVERY 4 HOURS PRN
Status: DISCONTINUED | OUTPATIENT
Start: 2024-02-26 | End: 2024-02-29 | Stop reason: HOSPADM

## 2024-02-26 RX ORDER — HYDROXYZINE HYDROCHLORIDE 10 MG/1
10 TABLET, FILM COATED ORAL 3 TIMES DAILY PRN
Status: DISCONTINUED | OUTPATIENT
Start: 2024-02-26 | End: 2024-02-28

## 2024-02-26 RX ORDER — ACETAMINOPHEN 325 MG/1
975 TABLET ORAL ONCE
Status: COMPLETED | OUTPATIENT
Start: 2024-02-26 | End: 2024-02-26

## 2024-02-26 RX ORDER — AMLODIPINE BESYLATE 2.5 MG/1
2.5 TABLET ORAL DAILY
Status: DISCONTINUED | OUTPATIENT
Start: 2024-02-27 | End: 2024-02-29 | Stop reason: HOSPADM

## 2024-02-26 RX ORDER — ONDANSETRON 2 MG/ML
4 INJECTION INTRAMUSCULAR; INTRAVENOUS EVERY 6 HOURS PRN
Status: DISCONTINUED | OUTPATIENT
Start: 2024-02-26 | End: 2024-02-29 | Stop reason: HOSPADM

## 2024-02-26 RX ORDER — ASPIRIN 81 MG/1
81 TABLET ORAL AT BEDTIME
Status: DISCONTINUED | OUTPATIENT
Start: 2024-02-27 | End: 2024-02-29 | Stop reason: HOSPADM

## 2024-02-26 RX ORDER — HYDROXYZINE HYDROCHLORIDE 10 MG/1
10 TABLET, FILM COATED ORAL 2 TIMES DAILY PRN
COMMUNITY

## 2024-02-26 RX ORDER — ACETAMINOPHEN 325 MG/1
975 TABLET ORAL EVERY 8 HOURS
Status: DISCONTINUED | OUTPATIENT
Start: 2024-02-27 | End: 2024-02-29 | Stop reason: HOSPADM

## 2024-02-26 RX ORDER — TAMSULOSIN HYDROCHLORIDE 0.4 MG/1
0.4 CAPSULE ORAL DAILY
Status: DISCONTINUED | OUTPATIENT
Start: 2024-02-27 | End: 2024-02-29 | Stop reason: HOSPADM

## 2024-02-26 RX ORDER — AMOXICILLIN 250 MG
1 CAPSULE ORAL 2 TIMES DAILY PRN
Status: DISCONTINUED | OUTPATIENT
Start: 2024-02-26 | End: 2024-02-29 | Stop reason: HOSPADM

## 2024-02-26 RX ORDER — AMOXICILLIN 250 MG
2 CAPSULE ORAL 2 TIMES DAILY PRN
Status: DISCONTINUED | OUTPATIENT
Start: 2024-02-26 | End: 2024-02-29 | Stop reason: HOSPADM

## 2024-02-26 RX ORDER — LIDOCAINE 4 G/G
1-2 PATCH TOPICAL
Status: DISCONTINUED | OUTPATIENT
Start: 2024-02-26 | End: 2024-02-29 | Stop reason: HOSPADM

## 2024-02-26 RX ORDER — DONEPEZIL HYDROCHLORIDE 5 MG/1
5 TABLET, FILM COATED ORAL DAILY
Status: DISCONTINUED | OUTPATIENT
Start: 2024-02-27 | End: 2024-02-29 | Stop reason: HOSPADM

## 2024-02-26 RX ORDER — LIDOCAINE 40 MG/G
CREAM TOPICAL
Status: DISCONTINUED | OUTPATIENT
Start: 2024-02-26 | End: 2024-02-29 | Stop reason: HOSPADM

## 2024-02-26 RX ORDER — ENALAPRIL MALEATE 10 MG/1
10 TABLET ORAL DAILY
Status: DISCONTINUED | OUTPATIENT
Start: 2024-02-27 | End: 2024-02-29 | Stop reason: HOSPADM

## 2024-02-26 RX ORDER — SIMVASTATIN 10 MG
20 TABLET ORAL AT BEDTIME
Status: DISCONTINUED | OUTPATIENT
Start: 2024-02-26 | End: 2024-02-29 | Stop reason: HOSPADM

## 2024-02-26 RX ADMIN — ACETAMINOPHEN 975 MG: 325 TABLET, FILM COATED ORAL at 20:40

## 2024-02-26 RX ADMIN — SIMVASTATIN 20 MG: 10 TABLET, FILM COATED ORAL at 23:44

## 2024-02-26 ASSESSMENT — ACTIVITIES OF DAILY LIVING (ADL)
ADLS_ACUITY_SCORE: 36

## 2024-02-26 ASSESSMENT — COLUMBIA-SUICIDE SEVERITY RATING SCALE - C-SSRS
6. HAVE YOU EVER DONE ANYTHING, STARTED TO DO ANYTHING, OR PREPARED TO DO ANYTHING TO END YOUR LIFE?: NO
1. IN THE PAST MONTH, HAVE YOU WISHED YOU WERE DEAD OR WISHED YOU COULD GO TO SLEEP AND NOT WAKE UP?: NO
2. HAVE YOU ACTUALLY HAD ANY THOUGHTS OF KILLING YOURSELF IN THE PAST MONTH?: NO

## 2024-02-26 NOTE — ED TRIAGE NOTES
Pt arrives with family for groin pain after falling today at around 1630. Pt states he was walking and fell onto the left hip, states he got a little dizzy. Pt denies hitting his head and is not on thinners. ABCS intact and Aox4.      Triage Assessment (Adult)       Row Name 02/26/24 2374          Triage Assessment    Airway WDL WDL        Respiratory WDL    Respiratory WDL WDL        Peripheral/Neurovascular WDL    Peripheral Neurovascular WDL WDL

## 2024-02-27 LAB
ANION GAP SERPL CALCULATED.3IONS-SCNC: 9 MMOL/L (ref 7–15)
ATRIAL RATE - MUSE: 88 BPM
BUN SERPL-MCNC: 20.6 MG/DL (ref 8–23)
CALCIUM SERPL-MCNC: 8.3 MG/DL (ref 8.2–9.6)
CHLORIDE SERPL-SCNC: 106 MMOL/L (ref 98–107)
CREAT SERPL-MCNC: 1.37 MG/DL (ref 0.67–1.17)
DEPRECATED HCO3 PLAS-SCNC: 24 MMOL/L (ref 22–29)
DIASTOLIC BLOOD PRESSURE - MUSE: NORMAL MMHG
EGFRCR SERPLBLD CKD-EPI 2021: 49 ML/MIN/1.73M2
ERYTHROCYTE [DISTWIDTH] IN BLOOD BY AUTOMATED COUNT: 12.7 % (ref 10–15)
GLUCOSE SERPL-MCNC: 95 MG/DL (ref 70–99)
HCT VFR BLD AUTO: 32.2 % (ref 40–53)
HGB BLD-MCNC: 10.7 G/DL (ref 13.3–17.7)
INTERPRETATION ECG - MUSE: NORMAL
MCH RBC QN AUTO: 32.4 PG (ref 26.5–33)
MCHC RBC AUTO-ENTMCNC: 33.2 G/DL (ref 31.5–36.5)
MCV RBC AUTO: 98 FL (ref 78–100)
P AXIS - MUSE: 56 DEGREES
PLATELET # BLD AUTO: 177 10E3/UL (ref 150–450)
POTASSIUM SERPL-SCNC: 4.1 MMOL/L (ref 3.4–5.3)
PR INTERVAL - MUSE: 180 MS
QRS DURATION - MUSE: 84 MS
QT - MUSE: 366 MS
QTC - MUSE: 442 MS
R AXIS - MUSE: 66 DEGREES
RBC # BLD AUTO: 3.3 10E6/UL (ref 4.4–5.9)
SODIUM SERPL-SCNC: 139 MMOL/L (ref 135–145)
SYSTOLIC BLOOD PRESSURE - MUSE: NORMAL MMHG
T AXIS - MUSE: 42 DEGREES
VENTRICULAR RATE- MUSE: 88 BPM
WBC # BLD AUTO: 5.4 10E3/UL (ref 4–11)

## 2024-02-27 PROCEDURE — 250N000013 HC RX MED GY IP 250 OP 250 PS 637: Performed by: PHYSICIAN ASSISTANT

## 2024-02-27 PROCEDURE — 85027 COMPLETE CBC AUTOMATED: CPT | Performed by: PHYSICIAN ASSISTANT

## 2024-02-27 PROCEDURE — 120N000001 HC R&B MED SURG/OB

## 2024-02-27 PROCEDURE — 99232 SBSQ HOSP IP/OBS MODERATE 35: CPT | Performed by: STUDENT IN AN ORGANIZED HEALTH CARE EDUCATION/TRAINING PROGRAM

## 2024-02-27 PROCEDURE — 36415 COLL VENOUS BLD VENIPUNCTURE: CPT | Performed by: PHYSICIAN ASSISTANT

## 2024-02-27 PROCEDURE — 80048 BASIC METABOLIC PNL TOTAL CA: CPT | Performed by: PHYSICIAN ASSISTANT

## 2024-02-27 RX ADMIN — HYDROXYZINE HYDROCHLORIDE 10 MG: 10 TABLET ORAL at 06:02

## 2024-02-27 RX ADMIN — ACETAMINOPHEN 975 MG: 325 TABLET, FILM COATED ORAL at 13:31

## 2024-02-27 RX ADMIN — ACETAMINOPHEN 975 MG: 325 TABLET, FILM COATED ORAL at 05:18

## 2024-02-27 RX ADMIN — ASPIRIN 81 MG: 81 TABLET, COATED ORAL at 21:09

## 2024-02-27 RX ADMIN — ACETAMINOPHEN 975 MG: 325 TABLET, FILM COATED ORAL at 21:09

## 2024-02-27 RX ADMIN — TAMSULOSIN HYDROCHLORIDE 0.4 MG: 0.4 CAPSULE ORAL at 09:17

## 2024-02-27 RX ADMIN — DONEPEZIL HYDROCHLORIDE 5 MG: 5 TABLET ORAL at 09:17

## 2024-02-27 RX ADMIN — SIMVASTATIN 20 MG: 10 TABLET, FILM COATED ORAL at 21:09

## 2024-02-27 RX ADMIN — HYDROXYZINE HYDROCHLORIDE 10 MG: 10 TABLET ORAL at 12:06

## 2024-02-27 RX ADMIN — ACETAMINOPHEN 325 MG: 325 TABLET, FILM COATED ORAL at 09:17

## 2024-02-27 ASSESSMENT — ACTIVITIES OF DAILY LIVING (ADL)
ADLS_ACUITY_SCORE: 27
DIFFICULTY_EATING/SWALLOWING: NO
ADLS_ACUITY_SCORE: 27
DRESSING/BATHING_DIFFICULTY: NO
ADLS_ACUITY_SCORE: 27
ADLS_ACUITY_SCORE: 27
USE_OF_HEARING_ASSISTIVE_DEVICES: LEFT HEARING AID;RIGHT HEARING AID
DESCRIBE_HEARING_LOSS: BILATERAL HEARING LOSS
ADLS_ACUITY_SCORE: 27
WEAR_GLASSES_OR_BLIND: YES
ADLS_ACUITY_SCORE: 27
ADLS_ACUITY_SCORE: 27
DIFFICULTY_COMMUNICATING: NO
ADLS_ACUITY_SCORE: 27
ADLS_ACUITY_SCORE: 27
WERE_AUXILIARY_AIDS_OFFERED?: NO
ADLS_ACUITY_SCORE: 30
THE_FOLLOWING_AIDS_WERE_PROVIDED;: PATIENT DECLINED OFFER OF AUXILIARY AIDS
ADLS_ACUITY_SCORE: 27
CHANGE_IN_FUNCTIONAL_STATUS_SINCE_ONSET_OF_CURRENT_ILLNESS/INJURY: NO
WALKING_OR_CLIMBING_STAIRS_DIFFICULTY: NO
ADLS_ACUITY_SCORE: 27
ADLS_ACUITY_SCORE: 27
TOILETING_ISSUES: NO
ADLS_ACUITY_SCORE: 27
CONCENTRATING,_REMEMBERING_OR_MAKING_DECISIONS_DIFFICULTY: NO
ADLS_ACUITY_SCORE: 27
FALL_HISTORY_WITHIN_LAST_SIX_MONTHS: YES
NUMBER_OF_TIMES_PATIENT_HAS_FALLEN_WITHIN_LAST_SIX_MONTHS: 1
ADLS_ACUITY_SCORE: 27
ADLS_ACUITY_SCORE: 27
HEARING_DIFFICULTY_OR_DEAF: YES
ADLS_ACUITY_SCORE: 30
ADLS_ACUITY_SCORE: 27
PATIENT'S_PREFERRED_MEANS_OF_COMMUNICATION: VERBAL
DOING_ERRANDS_INDEPENDENTLY_DIFFICULTY: NO

## 2024-02-27 NOTE — H&P
[facility]  Internal Medicine  History and Physical      Patient Name: Milton Marmolejo MRN# [id number]   Age: 89 year old YOB: 1932     Date of Admission:2/26/2024    Primary care provider: [facility]  Date of Service: 2/26/2024         Assessment and Plan:   Milton Marmolejo is a 89 year old male with a history of HTN, TIA, HLD, MGUS, NGOZI, CVA, Dementia who presents after a fall.    Non displaced Left Sacral Ala Fracture  Non displaced Left Inferior Pubic Ramus Fracture  Non displaced Fracture at the Left Superior Pubic Root/Puboacetabular Junction that extends to the Superior Pubic Ramus  Mechanical fall today while on a walk outside.  - analgesic prn, try an avoid narcotics; low dose oxycodone available for severe pain  - uses Atarax for itching pta; will add prn for pain as well  - non weight bearing for now  - Orthopedic Surgery consult  - PT/SW consults     HTN  - continue Amlodipine and Enalapril    HLD  - continue Simvastatin    Hx CVA/TIA  - continue ASA and Simvastatin    CKD  - baseline creatinine 1.3-1.7    BPH  - continue Flomax    NGOZI  - continue cpap    Dementia  - continue Aricept    Hx of MGUS  - wbc, plt wnl.  Hgb 11.3 at baseline which is around 12.    CODE: DNR/DNI confirmed with daughter at the bedside  Diet/IVF: regular  DVT ppx: scd    Loni Mark MS PA-C  Physician Assistant   Hospitalist Service    Awaiting formal pharmacy med rec to confirm home medications.         Chief Complaint:   Groin Injury and Fall          HPI:   89 year old male with a history of HTN, TIA, HLD, MGUS, NGOZI, CVA, Dementia who presents after a fall.    Patient lives at [facility] in an independent apartment.  He went for a walk today up a hill to see where the sweet corn is grown and on his way back to the facility fell on his left side.  He reported left groin pain.  He denies hitting his head or LOC.  He denies any chest pain prior to his fall.   He was found by a resident at the facility who alerted EMS.  Patient's daughter is present at the bedside and reports patient has baseline vascular dementia and has difficulty processing information and is impulsive at baseline.  His wife currently lives in a memory care unit.           Past Medical History:     Past Medical History:   Diagnosis Date    Hypertension     Mumps     TIA (transient ischemic attack)           Past Surgical History:   Appendectomy       Social History:     Social History     Socioeconomic History    Marital status:      Spouse name: Not on file    Number of children: Not on file    Years of education: Not on file    Highest education level: Not on file   Occupational History    Not on file   Tobacco Use    Smoking status: Never    Smokeless tobacco: Never   Substance and Sexual Activity    Alcohol use: Not on file    Drug use: Not on file    Sexual activity: Not on file   Other Topics Concern    Parent/sibling w/ CABG, MI or angioplasty before 65F 55M? Not Asked   Social History Narrative    Not on file     Social Determinants of Health     Financial Resource Strain: Not on file   Food Insecurity: Not on file   Transportation Needs: Not on file   Physical Activity: Not on file   Stress: Not on file   Social Connections: Not on file   Interpersonal Safety: Not on file   Housing Stability: Not on file          Family History:   No family history on file.       Allergies:      Allergies   Allergen Reactions    Erythromycin      Other reaction(s): *Unknown    Memantine      Other reaction(s): *Unknown    Penicillins Itching          Medications:     Prior to Admission medications    Medication Sig Last Dose Taking? Auth Provider Long Term End Date   ACETAMINOPHEN PO Take 325 mg by mouth every 6 hours as needed for pain    Reported, Patient     amLODIPine (NORVASC) 2.5 MG tablet Take 1 tablet (2.5 mg) by mouth   Reported, Patient Yes    ASPIRIN PO Take 81 mg by mouth At Bedtime     Reported, Patient     donepezil (ARICEPT) 2.5 mg half-tab Take 2.5 mg by mouth At Bedtime   Unknown, Entered By History     enalapril (VASOTEC) 5 MG tablet    Reported, Patient Yes    Enalapril Maleate (VASOTEC PO) Take 5 mg by mouth At Bedtime    Reported, Patient Yes    finasteride (PROSCAR) 5 MG tablet    Reported, Patient     fluticasone (FLONASE) 50 MCG/ACT nasal spray Spray 2 sprays into both nostrils daily as needed    Reported, Patient     multivitamin, therapeutic with minerals (THERA-VIT-M) TABS Take 1 tablet by mouth daily   Reported, Patient     simvastatin (ZOCOR) 20 MG tablet    Reported, Patient Yes    SIMVASTATIN PO Take 40 mg by mouth At Bedtime    Reported, Patient Yes    tamsulosin (FLOMAX) 0.4 MG capsule Take 1 capsule (0.4 mg) by mouth daily  Patient not taking: Reported on 11/13/2018   Laith Langley MD            Review of Systems:   A complete ROS was performed and is negative other than what is stated in the HPI.       Physical Exam:   Blood pressure (!) 167/100, pulse 83, temperature 98.6  F (37  C), temperature source Oral, resp. rate 14, weight 80.3 kg (177 lb), SpO2 92%.  General: Alert, interactive, NAD, pleasant and cooperative  HEENT: AT/NC, sclera anicteric, PERRL, EOMI, MMM  Neck: Supple, no JVD  Chest/Resp: clear to auscultation bilaterally, no crackles or wheezes  Heart/CV: regular rate and rhythm, no murmur  Abdomen/GI: Soft, nontender, nondistended. +BS.  No rebound or guarding.  Extremities/MSK: 1+ BLE edema.  ROM testing not performed  Skin: Warm and dry  Neuro: Alert & oriented to person and knows he is in a hospital.  Oriented to his daughter and able to tell me where he lives and how he fell.  Tells me it is 2021         Labs:   ROUTINE ICU LABS (Last four results)  CMP  Recent Labs   Lab 02/26/24 2008      POTASSIUM 3.9   CHLORIDE 103   CO2 23   ANIONGAP 10   *   BUN 21.2   CR 1.41*   GFRESTIMATED 47*   STACIA 8.7     CBC  Recent Labs   Lab  02/26/24 2008   WBC 9.7   RBC 3.47*   HGB 11.3*   HCT 34.4*   MCV 99   MCH 32.6   MCHC 32.8   RDW 13.0        INRNo lab results found in last 7 days.  Arterial Blood GasNo lab results found in last 7 days.       Imaging/Procedures:     Results for orders placed or performed during the hospital encounter of 02/26/24   XR Pelvis and Hip Left 2 Views    Narrative    EXAM: XR PELVIS AND HIP LEFT 2 VIEWS  LOCATION: [facility]  DATE: 2/26/2024    INDICATION: Pelvic pain. Left hip pain.  COMPARISON: None.      Impression    IMPRESSION: Anatomic alignment left hip. No acute displaced left hip fracture is identified. Mild bilateral hip osteoarthritis. No acute displaced pelvic fracture is seen. Degenerative change lower lumbar spine and both SI joints. Arterial calcification.   Calcifications overlie the scrotum.   CT Pelvis Bone wo Contrast    Narrative    EXAM: CT PELVIS BONE WO CONTRAST  LOCATION: [facility]  DATE: 2/26/2024    INDICATION: Left groin pain.  COMPARISON: Pelvis and left hip radiographic exam earlier today.  TECHNIQUE: CT scan of the pelvis was performed without IV contrast. Multiplanar reformats were obtained. Dose reduction techniques were used.  CONTRAST: None.    FINDINGS:    Nondisplaced radiographically occult left sacral ala, superior left pubic root/puboacetabular junction and the left inferior pubic rami fractures. No acute displaced lower lumbar spine fracture with degenerative change in the lower lumbar spine.   Symmetric degenerative SI joints. Diffuse bone demineralization. No right pelvic or sacral fracture is identified. No proximal femur fracture or CT finding for femoral head AVN. Mild arthritis at the symphysis pubis. No concerning bone lesion.    Arterial calcification. Colonic diverticulosis. No evidence for intestinal obstruction. No adenopathy. Mildly distended urinary bladder with enlarged prostate gland which should be correlated  clinically. Shotty inguinal lymph nodes. No sizable hip joint   effusion. Small amount of edema or blood products seen along the left pelvic sidewall.       Impression    IMPRESSION:  1.  Nondisplaced radiographically occult left sacral ala fracture.  2.  Nondisplaced radiographically occult left inferior pubic ramus fracture.  3.  Nondisplaced radiographically occult fracture at the left superior pubic root/puboacetabular junction extends to the superior pubic ramus.  4.  No CT finding for proximal femur fracture or CT finding for femoral head AVN. Mild bilateral hip osteoarthritis.  5.  Distended urinary bladder with enlarged prostate gland. Correlate clinically.    NOTE: ABNORMAL REPORT    THE DICTATION ABOVE DESCRIBES AN ABNORMALITY FOR WHICH FOLLOW-UP IS NEEDED.

## 2024-02-27 NOTE — PROGRESS NOTES
[facility]    Medicine Progress Note - Hospitalist Service    Date of Admission:  2/26/2024    Assessment & Plan   Milton Marmolejo is a 89 year old male with a history of HTN, TIA, HLD, MGUS, NGOZI, CVA, Dementia who presents after a fall.  Found to have multiple pelvic fractures, orthopedic surgery consulted.  Awaiting surgical plan.     Non displaced Left Sacral Ala Fracture  Non displaced Left Inferior Pubic Ramus Fracture  Non displaced Fracture at the Left Superior Pubic Root/Puboacetabular Junction that extends to the Superior Pubic Ramus  Mechanical fall day of admission while on a walking outside.  Able to ambulate after fall, but had persistent severe left groin pain.  Evaluation in the ED revealed multiple pelvic fractures as documented above.  Orthopedic surgery consulted.  - analgesic prn, trying avoid opioids as able; low dose oxycodone available for severe pain  - uses Atarax for itching pta; will add prn for pain as well  - non weight bearing for now  - Orthopedic Surgery consult  - PT/SW consults      HTN  - continue Amlodipine and Enalapril     HLD  - continue Simvastatin     Hx CVA/TIA  - continue ASA and Simvastatin     CKD  - baseline creatinine 1.3-1.7.  At baseline on admission.     BPH  - continue Flomax     NGOZI  - continue cpap     Dementia  - continue Aricept     Hx of MGUS  - wbc, plt wnl.  Hgb 11.3 admission, at baseline is around 12.          Diet: Combination Diet Regular Diet Adult    DVT Prophylaxis: No chemical prophylaxis with possible upcoming operative management of pelvic fractures.  Mcdermott Catheter: Not present  Lines: None     Cardiac Monitoring: None  Code Status: No CPR- Do NOT Intubate      Clinically Significant Risk Factors Present on Admission                # Drug Induced Platelet Defect: home medication list includes an antiplatelet medication   # Hypertension: Home medication list includes antihypertensive(s)                 Disposition Plan       Expected Discharge Date: 02/28/2024      Destination: assisted living  Discharge Comments: Home            Salvatore Forrester MD  Hospitalist Service  [facility]  Securely message with [employer] (more info)  Text page via [employer] Paging/Directory   ______________________________________________________________________    Interval History   No acute events overnight.  N.p.o. and awaiting orthopedic surgery consult.  Pain overall pretty well-controlled without use of opioids.    Physical Exam   Vital Signs: Temp: 97.9  F (36.6  C) Temp src: Temporal BP: 103/67 Pulse: 66   Resp: 13 SpO2: 94 % O2 Device: None (Room air)    Weight: 177 lbs 0 oz    General: Alert, interactive, NAD, pleasant and cooperative  HEENT: AT/NC, sclera anicteric, PERRL, EOMI, MMM  Neck: Supple, no JVD  Chest/Resp: clear to auscultation bilaterally, no crackles or wheezes  Heart/CV: regular rate and rhythm, no murmur  Abdomen/GI: Soft, nontender, nondistended. +BS.  No rebound or guarding.  Extremities/MSK: 1+ BLE edema.  ROM testing not performed due to injury  Skin: Warm and dry  Neuro: Alert & oriented to person and hospital.  Intermittent orientation to time.    Medical Decision Making       45 MINUTES SPENT BY ME on the date of service doing chart review, history, exam, documentation & further activities per the note.      Data     I have personally reviewed the following data over the past 24 hrs:    5.4  \   10.7 (L)   / 177     139 106 20.6 /  95   4.1 24 1.37 (H) \       Imaging results reviewed over the past 24 hrs:   Recent Results (from the past 24 hour(s))   XR Pelvis and Hip Left 2 Views    Narrative    EXAM: XR PELVIS AND HIP LEFT 2 VIEWS  LOCATION: [facility]  DATE: 2/26/2024    INDICATION: Pelvic pain. Left hip pain.  COMPARISON: None.      Impression    IMPRESSION: Anatomic alignment left hip. No acute displaced left hip fracture is identified. Mild bilateral hip osteoarthritis. No acute  displaced pelvic fracture is seen. Degenerative change lower lumbar spine and both SI joints. Arterial calcification.   Calcifications overlie the scrotum.   CT Pelvis Bone wo Contrast    Narrative    EXAM: CT PELVIS BONE WO CONTRAST  LOCATION: [facility]  DATE: 2/26/2024    INDICATION: Left groin pain.  COMPARISON: Pelvis and left hip radiographic exam earlier today.  TECHNIQUE: CT scan of the pelvis was performed without IV contrast. Multiplanar reformats were obtained. Dose reduction techniques were used.  CONTRAST: None.    FINDINGS:    Nondisplaced radiographically occult left sacral ala, superior left pubic root/puboacetabular junction and the left inferior pubic rami fractures. No acute displaced lower lumbar spine fracture with degenerative change in the lower lumbar spine.   Symmetric degenerative SI joints. Diffuse bone demineralization. No right pelvic or sacral fracture is identified. No proximal femur fracture or CT finding for femoral head AVN. Mild arthritis at the symphysis pubis. No concerning bone lesion.    Arterial calcification. Colonic diverticulosis. No evidence for intestinal obstruction. No adenopathy. Mildly distended urinary bladder with enlarged prostate gland which should be correlated clinically. Shotty inguinal lymph nodes. No sizable hip joint   effusion. Small amount of edema or blood products seen along the left pelvic sidewall.       Impression    IMPRESSION:  1.  Nondisplaced radiographically occult left sacral ala fracture.  2.  Nondisplaced radiographically occult left inferior pubic ramus fracture.  3.  Nondisplaced radiographically occult fracture at the left superior pubic root/puboacetabular junction extends to the superior pubic ramus.  4.  No CT finding for proximal femur fracture or CT finding for femoral head AVN. Mild bilateral hip osteoarthritis.  5.  Distended urinary bladder with enlarged prostate gland. Correlate clinically.    NOTE: ABNORMAL  REPORT    THE DICTATION ABOVE DESCRIBES AN ABNORMALITY FOR WHICH FOLLOW-UP IS NEEDED.

## 2024-02-27 NOTE — ED PROVIDER NOTES
Patient was endorsed to me at end of shift by Dr. Mueller.  Patient had what sounds like a mechanical fall on the left hip.  X-rays were negative but patient has severe pain with trying to weight-bear and ambulate.  CT was performed and shows a left sacral alar fracture and left pubic ramus fracture.  I discussed with PRASHANT Toribio for the hospitalist team who accepts the admission for Dr. Goodman.     Haja Lock MD  02/26/24 1409

## 2024-02-27 NOTE — ED NOTES
"[facility]  ED Nurse Handoff Report    ED Chief complaint: Groin Injury and Fall  . ED Diagnosis:   Final diagnoses:   Fall, initial encounter   Left groin pain   Closed fracture of sacrum, unspecified portion of sacrum, initial encounter (H)   Pubic ramus fracture, left, closed, initial encounter (H)       Allergies:   Allergies   Allergen Reactions    Erythromycin      Other reaction(s): *Unknown    Memantine      Other reaction(s): *Unknown    Penicillins Itching       Code Status: Full Code    Activity level - Baseline/Home:  independent.  Activity Level - Current:   standby.   Lift room needed: No.   Bariatric: No   Needed: No   Isolation: No.   Infection: Not Applicable.     Respiratory status: Room air    Vital Signs (within 30 minutes):   Vitals:    02/26/24 1950 02/26/24 2000 02/26/24 2010 02/26/24 2020   BP: (!) 167/100      Pulse: 86 84 82 83   Resp: 12 11 14 14   Temp:       TempSrc:       SpO2: 96% 94% 96% 92%   Weight:           Cardiac Rhythm:  ,      Pain level:    Patient confused: No.   Patient Falls Risk: arm band in place and toileting schedule implemented.   Elimination Status: Has voided     Patient Report - Initial Complaint: Fall .   Focused Assessment: Milton Marmolejo is a 89 year old male who presents with fall and left groin pain.  Patient is a 89-year-old male who is otherwise healthy not anticoagulated lives independently in an apartment.  Patient states \"he walked too much\" and was walking up a hill and started to come back down patient describes feeling unsteady and then falling and landing on the likely the left side.  Patient denies head injury or loss of consciousness had complaints of left groin pain brought to the emergency by private car for assessment.      Abnormal Results:   Labs Ordered and Resulted from Time of ED Arrival to Time of ED Departure   BASIC METABOLIC PANEL - Abnormal       Result Value    Sodium 136      Potassium 3.9      " Chloride 103      Carbon Dioxide (CO2) 23      Anion Gap 10      Urea Nitrogen 21.2      Creatinine 1.41 (*)     GFR Estimate 47 (*)     Calcium 8.7      Glucose 109 (*)    CBC WITH PLATELETS AND DIFFERENTIAL - Abnormal    WBC Count 9.7      RBC Count 3.47 (*)     Hemoglobin 11.3 (*)     Hematocrit 34.4 (*)     MCV 99      MCH 32.6      MCHC 32.8      RDW 13.0      Platelet Count 179      % Neutrophils 83      % Lymphocytes 10      % Monocytes 7      % Eosinophils 0      % Basophils 0      % Immature Granulocytes 0      NRBCs per 100 WBC 0      Absolute Neutrophils 8.0      Absolute Lymphocytes 1.0      Absolute Monocytes 0.6      Absolute Eosinophils 0.0      Absolute Basophils 0.0      Absolute Immature Granulocytes 0.0      Absolute NRBCs 0.0          CT Pelvis Bone wo Contrast   Final Result   IMPRESSION:   1.  Nondisplaced radiographically occult left sacral ala fracture.   2.  Nondisplaced radiographically occult left inferior pubic ramus fracture.   3.  Nondisplaced radiographically occult fracture at the left superior pubic root/puboacetabular junction extends to the superior pubic ramus.   4.  No CT finding for proximal femur fracture or CT finding for femoral head AVN. Mild bilateral hip osteoarthritis.   5.  Distended urinary bladder with enlarged prostate gland. Correlate clinically.      NOTE: ABNORMAL REPORT      THE DICTATION ABOVE DESCRIBES AN ABNORMALITY FOR WHICH FOLLOW-UP IS NEEDED.       XR Pelvis and Hip Left 2 Views   Final Result   IMPRESSION: Anatomic alignment left hip. No acute displaced left hip fracture is identified. Mild bilateral hip osteoarthritis. No acute displaced pelvic fracture is seen. Degenerative change lower lumbar spine and both SI joints. Arterial calcification.    Calcifications overlie the scrotum.          Treatments provided: See MAR  Family Comments: daughter   OBS brochure/video discussed/provided to patient:  Yes  ED Medications:   Medications   acetaminophen  (TYLENOL) tablet 975 mg (975 mg Oral $Given 2/26/24 2040)       Drips infusing:  No  For the majority of the shift this patient was Green.   Interventions performed were N A .    Sepsis treatment initiated: No    Cares/treatment/interventions/medications to be completed following ED care: NA     ED Nurse Name: Alfie Cervantes RN  9:17 PM   RECEIVING UNIT ED HANDOFF REVIEW    Above ED Nurse Handoff Report was reviewed: No  Reviewed by: Ivy Gorman RN on February 27, 2024 at 12:23 AM   I Vidya called the ED to inform them the note was read: Yes  RECEIVING UNIT ED HANDOFF REVIEW    Above ED Nurse Handoff Report was reviewed: Yes  Reviewed by: Ivy Gorman RN on February 27, 2024 at 12:26 AM   I Vocanoop called the ED to inform them the note was read: Yes

## 2024-02-27 NOTE — PLAN OF CARE
[facility]    ED Boarding Nurse Handoff Addendum Report:    Date/time: 2/26/2024, 11:09 PM    Activity Level: in bed; strict bedrest    Fall Risk: Yes:  arm band in place, patient and family education, and assistive device/personal items within reach    Current care needs: ortho consult, pain mgmt, PT/OT    Vital signs (within last 30 minutes):    Vitals:    02/26/24 2010 02/26/24 2020 02/26/24 2100 02/26/24 2145   BP:   (!) 140/88 135/88   Pulse: 82 83 83 85   Resp: 14 14  14   Temp:    98.5  F (36.9  C)   TempSrc:    Oral   SpO2: 96% 92% 94% 96%   Weight:           Focused assessment within last 30 minutes:    AOx4. VSS. Pt reports 2/10 left pelvic pain. Using urinal; pt on bedrest. Daughter was at bedside but has since left.     ED Boarding Nurse name: Ruth Nugent RN

## 2024-02-27 NOTE — CONSULTS
"NUTRITION BRIEF NOTE      REASON FOR NUTRITION CONSULT:  Malnutrition screening tool (MST) total score of \"2\" with \"unsure\" answered to the question of \"have you recently lost weight without trying?\".      ASSESSMENT:  Per review of wt trending available, wt has been been stable since at least 8/2023.  Slight wt gain when compared to this date, but patient also has documented trace to mild ankle/leg edema:  Wt Readings from Last 10 Encounters:   02/26/24 80.3 kg (177 lb)   07/21/21 77.1 kg (170 lb)   05/06/21 79.4 kg (175 lb)   11/13/18 79.8 kg (176 lb)   08/14/18 79.8 kg (176 lb)   09/03/17 79.8 kg (176 lb)   02/03/16 78 kg (172 lb)   Weight 78.5 kg (173 lb) 08/31/2023 10:24 AM CDT     FOLLOW UP:   This brings total MST score down to \"0\".  Will therefore chart check at LOS unless formally consulted in the interim.  Please formally consult if needs arise.    Daniela Snow RDN, LD  Clinical Dietitian  3rd floor/ICU: [phone]  All other floors: [phone]  Weekend/holiday: [phone]  Office: [phone]  "

## 2024-02-27 NOTE — PHARMACY-ADMISSION MEDICATION HISTORY
Pharmacist Admission Medication History    Admission medication history is complete. The information provided in this note is only as accurate as the sources available at the time of the update.    Information Source(s): Family member and CareEverywhere/SureScripts via in-person    Pertinent Information: none    Changes made to PTA medication list:  Added: atarax  Deleted: proscar  Changed: aricept, enalaprit    Allergies reviewed with patient and updates made in EHR: yes    Medication History Completed By: Stanley Jack [facility] 2/26/2024 9:50 PM    Prior to Admission medications    Medication Sig Last Dose Taking? Auth Provider Long Term End Date   amLODIPine (NORVASC) 2.5 MG tablet Take 1 tablet (2.5 mg) by mouth 2/26/2024 at am Yes Reported, Patient Yes    ASPIRIN PO Take 81 mg by mouth At Bedtime  2/25/2024 at pm Yes Reported, Patient     donepezil (ARICEPT) 2.5 mg half-tab Take 5 mg by mouth daily 2/26/2024 at am Yes Unknown, Entered By History     enalapril (VASOTEC) 5 MG tablet Take 10 mg by mouth daily 2/26/2024 at am Yes Reported, Patient Yes    fluticasone (FLONASE) 50 MCG/ACT nasal spray Spray 2 sprays into both nostrils daily as needed   Yes Reported, Patient     hydrOXYzine HCl (ATARAX) 10 MG tablet Take 10 mg by mouth 2 times daily as needed for itching  Yes Unknown, Entered By History     multivitamin, therapeutic with minerals (THERA-VIT-M) TABS Take 1 tablet by mouth daily 2/26/2024 at am Yes Reported, Patient     simvastatin (ZOCOR) 20 MG tablet Take 20 mg by mouth at bedtime 2/25/2024 at pm Yes Reported, Patient Yes    tamsulosin (FLOMAX) 0.4 MG capsule Take 1 capsule (0.4 mg) by mouth daily 2/25/2024 at after lunch Yes Laith Langley MD     ACETAMINOPHEN PO Take 325 mg by mouth every 6 hours as needed for pain    Reported, Patient

## 2024-02-27 NOTE — PLAN OF CARE
Patient vital signs are at baseline: Yes  Patient able to ambulate as they were prior to admission or with assist devices provided by therapies during their stay:  No,  Reason: declined to get out of bed today. Repositioned however is WBAT  Patient MUST void prior to discharge:  Yes, bladder scan for 187 ml  Patient able to tolerate oral intake:  Yes  Pain has adequate pain control using Oral analgesics:  Yes, Tylenol and atarax   Does patient have an identified :  Yes  Has goal D/C date and time been discussed with patient:  No,  Reason:  TBD     Disoriented to time and place. CMS intact. Calm and pleasant with cares. Will continue to provide supportive care.

## 2024-02-27 NOTE — ED PROVIDER NOTES
"  History     Chief Complaint:  Groin Injury and Fall       HPI   Milton Marmolejo is a 89 year old male who presents with fall and left groin pain.  Patient is a 89-year-old male who is otherwise healthy not anticoagulated lives independently in an apartment.  Patient states \"he walked too much\" and was walking up a hill and started to come back down patient describes feeling unsteady and then falling and landing on the likely the left side.  Patient denies head injury or loss of consciousness had complaints of left groin pain brought to the emergency by private car for assessment.      Independent Historian:   None - Patient Only    Review of External Notes:          Medications:    ACETAMINOPHEN PO  amLODIPine (NORVASC) 2.5 MG tablet  ASPIRIN PO  donepezil (ARICEPT) 2.5 mg half-tab  enalapril (VASOTEC) 5 MG tablet  Enalapril Maleate (VASOTEC PO)  finasteride (PROSCAR) 5 MG tablet  fluticasone (FLONASE) 50 MCG/ACT nasal spray  multivitamin, therapeutic with minerals (THERA-VIT-M) TABS  simvastatin (ZOCOR) 20 MG tablet  SIMVASTATIN PO  tamsulosin (FLOMAX) 0.4 MG capsule        Past Medical History:    Past Medical History:   Diagnosis Date    Hypertension     Mumps     TIA (transient ischemic attack)        Past Surgical History:    No past surgical history on file.     Physical Exam   Patient Vitals for the past 24 hrs:   BP Temp Temp src Pulse Resp SpO2 Weight   02/26/24 1755 (!) 169/100 -- -- 92 18 98 % --   02/26/24 1737 (!) 153/95 -- -- -- -- -- --   02/26/24 1734 -- 98.6  F (37  C) Oral 95 18 97 % 80.3 kg (177 lb)        Physical Exam  Vitals reviewed.   HENT:      Head: Normocephalic and atraumatic.      Nose: Nose normal.   Eyes:      Pupils: Pupils are equal, round, and reactive to light.   Cardiovascular:      Rate and Rhythm: Normal rate.   Pulmonary:      Effort: Pulmonary effort is normal.   Abdominal:      General: Abdomen is flat. Bowel sounds are normal.   Musculoskeletal:      Cervical back: No " tenderness.      Comments: Pain localizes in the inguinal canal on the left.  No shortening or rotation of the leg.  Patient is able to gingerly stand on the left side limited ambulation.  Normal femoral pulse.  Normal testicular lie.  No groin ecchymosis   Skin:     General: Skin is warm.      Capillary Refill: Capillary refill takes less than 2 seconds.   Neurological:      General: No focal deficit present.      Mental Status: He is alert and oriented to person, place, and time.   Psychiatric:         Mood and Affect: Mood normal.           Emergency Department Course     Imaging:  CT Pelvis Bone wo Contrast   Final Result   IMPRESSION:   1.  Nondisplaced radiographically occult left sacral ala fracture.   2.  Nondisplaced radiographically occult left inferior pubic ramus fracture.   3.  Nondisplaced radiographically occult fracture at the left superior pubic root/puboacetabular junction extends to the superior pubic ramus.   4.  No CT finding for proximal femur fracture or CT finding for femoral head AVN. Mild bilateral hip osteoarthritis.   5.  Distended urinary bladder with enlarged prostate gland. Correlate clinically.      NOTE: ABNORMAL REPORT      THE DICTATION ABOVE DESCRIBES AN ABNORMALITY FOR WHICH FOLLOW-UP IS NEEDED.       XR Pelvis and Hip Left 2 Views   Final Result   IMPRESSION: Anatomic alignment left hip. No acute displaced left hip fracture is identified. Mild bilateral hip osteoarthritis. No acute displaced pelvic fracture is seen. Degenerative change lower lumbar spine and both SI joints. Arterial calcification.    Calcifications overlie the scrotum.             Laboratory:  Labs Ordered and Resulted from Time of ED Arrival to Time of ED Departure   BASIC METABOLIC PANEL - Abnormal       Result Value    Sodium 136      Potassium 3.9      Chloride 103      Carbon Dioxide (CO2) 23      Anion Gap 10      Urea Nitrogen 21.2      Creatinine 1.41 (*)     GFR Estimate 47 (*)     Calcium 8.7       Glucose 109 (*)    CBC WITH PLATELETS AND DIFFERENTIAL - Abnormal    WBC Count 9.7      RBC Count 3.47 (*)     Hemoglobin 11.3 (*)     Hematocrit 34.4 (*)     MCV 99      MCH 32.6      MCHC 32.8      RDW 13.0      Platelet Count 179      % Neutrophils 83      % Lymphocytes 10      % Monocytes 7      % Eosinophils 0      % Basophils 0      % Immature Granulocytes 0      NRBCs per 100 WBC 0      Absolute Neutrophils 8.0      Absolute Lymphocytes 1.0      Absolute Monocytes 0.6      Absolute Eosinophils 0.0      Absolute Basophils 0.0      Absolute Immature Granulocytes 0.0      Absolute NRBCs 0.0              Emergency Department Course & Assessments:    Interventions:  Medications - No data to display     Assessments:      Independent Interpretation (X-rays, CTs, rhythm strip):  None    Consultations/Discussion of Management or Tests:  None        Social Determinants of Health affecting care:   None and geriatric age and independent living    Disposition:  Care of the patient was transferred to my colleague Dr. Lock pending ct and labs  Impression & Plan        Medical Decision Making:  Patient is a 89-year-old male living independently.  Fairly was walking a lot and then the following.  Does discomfort describes a pain in his left inguinal region.  X-rays negative for acute fracture.  Attempted ambulation failed.  High clinical suspicions may patient may require admission due to delays in CT imaging labs patient signed out to Dr. Mehta pending these.  Once obtained patient to be admitted for PT assessment this and likely placement      Diagnosis:    ICD-10-CM    1. Fall, initial encounter  W19.XXXA       2. Left groin pain  R10.32       3. Closed fracture of sacrum, unspecified portion of sacrum, initial encounter (H)  S32.10XA       4. Pubic ramus fracture, left, closed, initial encounter (H)  S32.592A            Discharge Medications:  New Prescriptions    No medications on file          Byron Mueller,  MD  2/26/2024   Byron Mueller MD Goodman, Brian Samuel, MD  02/28/24 2103

## 2024-02-27 NOTE — CARE PLAN
Arrived to room 608 from ER at 0045 via cart, alert and oriented x 3 occasionally not oriented to time oriented to room and call system, IV saline locked, rates pain 2/10. Admission profile started.

## 2024-02-27 NOTE — TREATMENT PLAN
Orthopedic Surgery      Milton Marmolejo is a 89 year old male with Dementia who presented to the hospital after a fall while walking outside.  Found to have multiple pelvic fractures     /67 (BP Location: Right arm)   Pulse 66   Temp 97.9  F (36.6  C) (Temporal)   Resp 13   Wt 80.3 kg (177 lb)   SpO2 94%   BMI 25.40 kg/m      CT Pelvis:  1.  Nondisplaced radiographically occult left sacral ala fracture.  2.  Nondisplaced radiographically occult left inferior pubic ramus fracture.  3.  Nondisplaced radiographically occult fracture at the left superior pubic root/puboacetabular junction extends to the superior pubic ramus.  4.  No CT finding for proximal femur fracture or CT finding for femoral head AVN. Mild bilateral hip osteoarthritis.  5.  Distended urinary bladder with enlarged prostate gland. Correlate clinically.    Plan:  Non-operative management  Non-WB Left LE until images have been reviewed by Dr Victoria.   Progress in PT as able    Chloe Wan PA-C on 2/27/2024 at 10:58 AM

## 2024-02-28 ENCOUNTER — APPOINTMENT (OUTPATIENT)
Dept: PHYSICAL THERAPY | Facility: CLINIC | Age: 89
DRG: 536 | End: 2024-02-28
Attending: PHYSICIAN ASSISTANT
Payer: MEDICARE

## 2024-02-28 PROCEDURE — 99232 SBSQ HOSP IP/OBS MODERATE 35: CPT | Performed by: STUDENT IN AN ORGANIZED HEALTH CARE EDUCATION/TRAINING PROGRAM

## 2024-02-28 PROCEDURE — 97161 PT EVAL LOW COMPLEX 20 MIN: CPT | Mod: GP | Performed by: PHYSICAL THERAPIST

## 2024-02-28 PROCEDURE — 97530 THERAPEUTIC ACTIVITIES: CPT | Mod: GP | Performed by: PHYSICAL THERAPIST

## 2024-02-28 PROCEDURE — 250N000013 HC RX MED GY IP 250 OP 250 PS 637: Performed by: PHYSICIAN ASSISTANT

## 2024-02-28 PROCEDURE — 120N000001 HC R&B MED SURG/OB

## 2024-02-28 PROCEDURE — 250N000013 HC RX MED GY IP 250 OP 250 PS 637: Performed by: STUDENT IN AN ORGANIZED HEALTH CARE EDUCATION/TRAINING PROGRAM

## 2024-02-28 RX ORDER — METHOCARBAMOL 500 MG/1
250 TABLET ORAL 3 TIMES DAILY PRN
Status: DISCONTINUED | OUTPATIENT
Start: 2024-02-28 | End: 2024-02-29 | Stop reason: HOSPADM

## 2024-02-28 RX ORDER — HYDROXYZINE HYDROCHLORIDE 10 MG/1
10 TABLET, FILM COATED ORAL 3 TIMES DAILY PRN
Status: DISCONTINUED | OUTPATIENT
Start: 2024-02-28 | End: 2024-02-29 | Stop reason: HOSPADM

## 2024-02-28 RX ORDER — HYDROXYZINE HYDROCHLORIDE 25 MG/1
25 TABLET, FILM COATED ORAL EVERY 4 HOURS PRN
Status: DISCONTINUED | OUTPATIENT
Start: 2024-02-28 | End: 2024-02-28

## 2024-02-28 RX ADMIN — ASPIRIN 81 MG: 81 TABLET, COATED ORAL at 21:18

## 2024-02-28 RX ADMIN — LIDOCAINE 2 PATCH: 4 PATCH TOPICAL at 12:31

## 2024-02-28 RX ADMIN — METHOCARBAMOL 250 MG: 500 TABLET ORAL at 21:18

## 2024-02-28 RX ADMIN — AMLODIPINE BESYLATE 2.5 MG: 2.5 TABLET ORAL at 08:55

## 2024-02-28 RX ADMIN — SIMVASTATIN 20 MG: 10 TABLET, FILM COATED ORAL at 21:18

## 2024-02-28 RX ADMIN — HYDROXYZINE HYDROCHLORIDE 10 MG: 10 TABLET ORAL at 05:27

## 2024-02-28 RX ADMIN — METHOCARBAMOL 250 MG: 500 TABLET ORAL at 15:07

## 2024-02-28 RX ADMIN — ACETAMINOPHEN 325 MG: 325 TABLET, FILM COATED ORAL at 12:31

## 2024-02-28 RX ADMIN — HYDROXYZINE HYDROCHLORIDE 10 MG: 10 TABLET ORAL at 12:31

## 2024-02-28 RX ADMIN — TAMSULOSIN HYDROCHLORIDE 0.4 MG: 0.4 CAPSULE ORAL at 08:55

## 2024-02-28 RX ADMIN — ENALAPRIL MALEATE 10 MG: 10 TABLET ORAL at 08:55

## 2024-02-28 RX ADMIN — ACETAMINOPHEN 975 MG: 325 TABLET, FILM COATED ORAL at 21:18

## 2024-02-28 RX ADMIN — ACETAMINOPHEN 975 MG: 325 TABLET, FILM COATED ORAL at 05:27

## 2024-02-28 RX ADMIN — OXYCODONE HYDROCHLORIDE 2.5 MG: 5 TABLET ORAL at 08:55

## 2024-02-28 RX ADMIN — ACETAMINOPHEN 975 MG: 325 TABLET, FILM COATED ORAL at 15:07

## 2024-02-28 RX ADMIN — DONEPEZIL HYDROCHLORIDE 5 MG: 5 TABLET ORAL at 08:55

## 2024-02-28 ASSESSMENT — ACTIVITIES OF DAILY LIVING (ADL)
ADLS_ACUITY_SCORE: 36
ADLS_ACUITY_SCORE: 30
ADLS_ACUITY_SCORE: 36
ADLS_ACUITY_SCORE: 30
ADLS_ACUITY_SCORE: 30
DEPENDENT_IADLS:: MEDICATION MANAGEMENT
ADLS_ACUITY_SCORE: 36
ADLS_ACUITY_SCORE: 30
ADLS_ACUITY_SCORE: 36
ADLS_ACUITY_SCORE: 30
ADLS_ACUITY_SCORE: 36
ADLS_ACUITY_SCORE: 36

## 2024-02-28 NOTE — PLAN OF CARE
Care continued: 1900 - 0730  Goal Outcome Evaluation:      Plan of Care Reviewed With: patient    Outcome Evaluation: Verbalized understanding of education    Critical Shift Events: Pt stood at bedside with assist of two but unable to take a step. Reported high levels of pain at end of shift- PRN medication given.    A+O to self and situation   Lung sounds clear, on room air without complication   BS present, no BM this shift, passing flatus   Voids spontaneously, PVR 36    VSS  /78 (BP Location: Right arm)   Pulse 77   Temp 98.2  F (36.8  C) (Temporal)   Resp 16   Wt 80.3 kg (177 lb)   SpO2 95%   BMI 25.40 kg/m       Cluster cares in effect to promote sleep and reduce risks for delirium. RN will continue to maintain care and the treatment plan this shift, handing off care to oncoming RN at 07:30 02/28/2024     Chloe Mendoza, RN 02/28/2024 7:02 AM

## 2024-02-28 NOTE — PLAN OF CARE
Orthopedic Plan of Care:     - Weight bearing as tolerated with walker as assistive device   - Mobilize with PT/OT

## 2024-02-28 NOTE — CONSULTS
[facility]    ORTHOPEDIC CONSULTATION NOTE     Patient: Milton Marmolejo 89 year oldmale   Admit Date: 2/26/2024          Today's Date: 2/28/2024  Attending: Salvatore Forrester MD HPI  The patient is a 89 year old male who initially presented to the ED after a fall on his left side. He resides at [facility] living in an independent apartment. He fell while returning back to his facility after looking at some sun flowers growing. Per his daughters, he is very active and walks independently. Upon arrival in the ED he reported left groin pain. He denies hitting his head or sustaining other injuries in his fall. Today, he he reports some pain in his left groin. He has ambulated minimally since admission. Patient denies numbness, paresthesias, loss of consciousness or other symptoms.  Medical history significant for baseline vascular dementia and hypertension.     Medical History  Past Medical History:   Diagnosis Date    Hypertension     Mumps     TIA (transient ischemic attack)        Surgical History  No past surgical history on file.    Medications  Current Facility-Administered Medications   Medication    acetaminophen (TYLENOL) tablet 325 mg    acetaminophen (TYLENOL) tablet 975 mg    amLODIPine (NORVASC) tablet 2.5 mg    aspirin EC tablet 81 mg    donepezil (ARICEPT) tablet 5 mg    enalapril (VASOTEC) tablet 10 mg    hydrOXYzine HCl (ATARAX) tablet 10 mg    Lidocaine (LIDOCARE) 4 % Patch 1-2 patch    lidocaine (LMX4) cream    lidocaine 1 % 0.1-1 mL    methocarbamol (ROBAXIN) half-tab 250 mg    ondansetron (ZOFRAN) injection 4 mg    oxyCODONE IR (ROXICODONE) half-tab 2.5 mg    senna-docusate (SENOKOT-S/PERICOLACE) 8.6-50 MG per tablet 1 tablet    Or    senna-docusate (SENOKOT-S/PERICOLACE) 8.6-50 MG per tablet 2 tablet    simvastatin (ZOCOR) tablet 20 mg    sodium chloride (PF) 0.9% PF flush 3 mL    sodium chloride (PF) 0.9% PF flush 3 mL    tamsulosin (FLOMAX) capsule 0.4 mg        Allergies  Allergies   Allergen Reactions    Erythromycin      Other reaction(s): *Unknown    Memantine      Other reaction(s): *Unknown    Penicillins Itching        Family History  No family history on file.    Social History  Social History     Socioeconomic History    Marital status:    Tobacco Use    Smoking status: Never    Smokeless tobacco: Never       ROS  + left groin pain. All other systems were reviewed and found to be negative    Physical Exam  BP 95/56   Pulse 64   Temp 98.4  F (36.9  C) (Temporal)   Resp 18   Wt 80.3 kg (177 lb)   SpO2 94%   BMI 25.40 kg/m    General: appears to be in no acute distress  LLE:   -skin warm, dry, intact, no gross deformity   -no ecchymosis, no TTP, no pain with ROM   -SILT DP/SP/saph/sural nerves   -+TA/EHL/GSC/Peroneals   -all compartments soft and compressible, no pain with PROM    -pulses palpable, brisk capillary refill    Imaging  CT Pelvis W/O Contrast demonstrates: non-displaced occult left sacral ala fracture, non-displaced occult left inferior pubic rami fracture, non-displaced occult left superior pubic root fracture at the pubic root/puboacetabular junction that extends into the superior pubic ramus.    AP Pelvis and Left Hip Xrays demonstrate: mild bilateral degenerative joint disease. No acute fracture noted in the right or left femur. No acute fracture noted in the pelvis.     Laboratory Values  Lab Results   Component Value Date    WBC 5.4 02/27/2024    HGB 10.7 (L) 02/27/2024    HCT 32.2 (L) 02/27/2024    MCV 98 02/27/2024     02/27/2024     Lab Results   Component Value Date     02/27/2024    CO2 24 02/27/2024    BUN 20.6 02/27/2024         PROBLEMS:     Principal Problem:    Pubic ramus fracture, left, closed, initial encounter (H)  Active Problems:    Left groin pain    Fall, initial encounter    Closed fracture of sacrum, unspecified portion of sacrum, initial encounter (H)      ASSESSMENT AND PLAN:      89 year old  male status post fall, resides at [facility] in independent living.  He is currently present with family members, one of which I spoke with yesterday, his daughter.  Milton was diagnosed with occult pelvic ring fractures on advanced imaging.  None of these injuries should require any further treatment, surgery.  As I explained to family as well as Milton (who is little bit groggy from narcotic administration prior to my arrival) with age as the bones become more demineralized, osteopenic, osteoporotic, some degree of fragility fracture would not be totally unexpected.  Thankfully his injuries are nondisplaced at current and simply would require watchful waiting, observation and pain control.  Okay weightbearing as tolerated with gait aids, physical therapy or other assist, fall prevention/precautions.  Family endorses that Milton is normally quite active despite his age, with a good amount of walking.  I explained to them that though this will be a slow course, pain control and PT will be our mainstay.  This would likely require rehab for a time before he gets back to his prior living arrangement, though he may have some degree of debility anyhow.  They understand, questions were answered.  Call with any questions otherwise this can be symptomatically managed going forward.      Josias Victoria DO, MSc  2/28/2024 3:38 [facility]

## 2024-02-28 NOTE — PROGRESS NOTES
02/28/24 0700   Appointment Info   Signing Clinician's Name / Credentials (PT) Masha Aaron, PT   Rehab Comments (PT) L LE WBAT with FWW   Living Environment   People in Home alone   Current Living Arrangements independent living facility   Home Accessibility no concerns   Transportation Anticipated family or friend will provide   Living Environment Comments Pt lives at [facility] in an Ind senior apt.  Wife at the same facility in the nursing home section.   Self-Care   Usual Activity Tolerance good   Current Activity Tolerance fair   Equipment Currently Used at Home none;grab bar, toilet;grab bar, tub/shower   Fall history within last six months yes   Number of times patient has fallen within last six months 1   Activity/Exercise/Self-Care Comment Pt ambulates without a device and is independent with all ADLs, including showering.  Daughter sets up meds. Pt reports doing own laundry and ambulates a longer distance to dinning room.   General Information   Onset of Illness/Injury or Date of Surgery 02/26/24   Referring Physician Loni Mark PA-C   Patient/Family Therapy Goals Statement (PT) pt and daughter agreeable to TCU   Pertinent History of Current Problem (include personal factors and/or comorbidities that impact the POC) Per medical chart: Milton Marmolejo is a 89 year old male with a history of HTN, TIA, HLD, MGUS, NGOZI, CVA, Dementia who presents after a fall.  Found to have multiple L pelvic fractures.  Non-operative management.   Existing Precautions/Restrictions fall   General Observations Pt lying in bed with daughter present.   Cognition   Follows Commands (Cognition) follows one-step commands;over 90% accuracy   Cognitive Status Comments Pt able to answer simple questions with daughter providing more background info on home set up.  Per chart pt with vascular dementia.   Pain Assessment   Patient Currently in Pain Yes, see Vital Sign flowsheet  (L pelvis fluctuating between 3-7 with  mobility)   Integumentary/Edema   Integumentary/Edema Comments age related skin changes   Posture    Posture Forward head position;Protracted shoulders   Range of Motion (ROM)   ROM Comment limited L hip ROM due to pain.   Strength (Manual Muscle Testing)   Strength (Manual Muscle Testing) Deficits observed during functional mobility   Strength Comments decreased LE strength due to pain/fxs.  L hip flex and knee ext 3-/5.  R LE atleast 3/5   Bed Mobility   Bed Mobility supine-sit   Supine-Sit [place] (Bed Mobility) minimum assist (75% patient effort)   Comment, (Bed Mobility) Sup > sit Min A at trunk and Mod A at L LE with HOB elevated   Transfers   Transfers sit-stand transfer   Sit-Stand Transfer   Sit-Stand [place] (Transfers) minimum assist (75% patient effort);verbal cues   Assistive Device (Sit-Stand Transfers) walker, front-wheeled   Comment, (Sit-Stand Transfer) sit > stand to FWW with Min A   Gait/Stairs (Locomotion)   Comment, (Gait/Stairs) 4-5 pivot steps bed > chair with FWW   Balance   Balance Comments Impaired dynamic standing balance requiring B UE support on FWW and CGA at trunk for safety   Sensory Examination   Sensory Perception patient reports no sensory changes   Coordination   Coordination no deficits were identified   Muscle Tone   Muscle Tone no deficits were identified   Clinical Impression   Criteria for Skilled Therapeutic Intervention Yes, treatment indicated   PT Diagnosis (PT) Impaired functional mobility   Influenced by the following impairments L pelvic pain, decreased LE strength, impaired balance, impaired cognition   Functional limitations due to impairments Impaired gait, increased falls risk, assisted mobility and ADLs   Clinical Presentation (PT Evaluation Complexity) stable   Clinical Presentation Rationale Pt medically stable, supportive daughters   Clinical Decision Making (Complexity) low complexity   Planned Therapy Interventions (PT) bed mobility training;balance  training;cryotherapy;gait training;patient/family education;ROM (range of motion);strengthening;transfer training;progressive activity/exercise;risk factor education;home program guidelines   Risk & Benefits of therapy have been explained evaluation/treatment results reviewed;care plan/treatment goals reviewed;risks/benefits reviewed;current/potential barriers reviewed;participants voiced agreement with care plan;participants included;patient;daughter   PT Total Evaluation Time   PT Eval, Low Complexity Minutes (61607) 10   Physical Therapy Goals   PT Frequency 5x/week   PT Predicted Duration/Target Date for Goal Attainment 03/01/24   PT Goals Bed Mobility;Transfers;Gait   PT: Bed Mobility Supervision/stand-by assist;Supine to/from sit   PT: Transfers Supervision/stand-by assist;Sit to/from stand;Assistive device   PT: Gait Supervision/stand-by assist;Rolling walker;50 feet   PT Discharge Planning   PT Plan Coordinate with pain meds, progress bed mobility, transfers and gait distance as tolerated   PT Discharge Recommendation (DC Rec) Transitional Care Facility   PT Rationale for DC Rec Pt significantly below baseline with mobility.  Pt lives alone in a  Apt.  At baseline pt ambulates without a device and was ind with all ADLs.  Pt currently requires Min/Mod A with bed mobility, and Min A to perform a stand pivot transfer to chair.  Pt unable to amb beyond bedside at this time due to L pelvic pain from fractures.  Recommend TCU to increase strength and progress safety and independence with all mobility while allowing time for therapeutic healing and pain reduction.   PT Brief overview of current status Min/Mod A bed mobility, Min A stand pivot bed <> chair with FWW

## 2024-02-28 NOTE — PROGRESS NOTES
[facility]    Medicine Progress Note - Hospitalist Service    Date of Admission:  2/26/2024    Assessment & Plan   Milton Marmolejo is a 89 year old male with a history of HTN, TIA, HLD, MGUS, NGOZI, CVA, Dementia who presents after a fall.  Found to have multiple pelvic fractures, orthopedic surgery consulted.  Ortho recommending non-operative management. PT assessment pending.      Non displaced Left Sacral Ala Fracture  Non displaced Left Inferior Pubic Ramus Fracture  Non displaced Fracture at the Left Superior Pubic Root/Puboacetabular Junction that extends to the Superior Pubic Ramus  Mechanical fall day of admission while on a walking outside.  Able to ambulate after fall, but had persistent severe left groin pain.  Evaluation in the ED revealed multiple pelvic fractures as documented above.  Orthopedic surgery consulted.  - analgesic prn, trying avoid opioids as able; low dose oxycodone available for severe pain  - uses Atarax for itching pta; will add prn for pain as well  - non weight bearing for now  - Orthopedic Surgery consulted, non-operative management per their recommendation  - PT/SW consults      HTN  - continue Amlodipine and Enalapril     HLD  - continue Simvastatin     Hx CVA/TIA  - continue ASA and Simvastatin     CKD stage 3a  - baseline creatinine 1.3-1.7.  At baseline on admission.     BPH  - continue Flomax     NGOZI  - continue cpap     Dementia  - continue Aricept     Hx of MGUS  - wbc, plt wnl.  Hgb 11.3 admission, at baseline is around 12.          Diet: Combination Diet Regular Diet Adult    DVT Prophylaxis: No chemical prophylaxis with possible upcoming operative management of pelvic fractures.  [place] Catheter: Not present  Lines: None     Cardiac Monitoring: None  Code Status: No CPR- Do NOT Intubate      Clinically Significant Risk Factors                                    Disposition Plan      Expected Discharge Date: 02/29/2024      Destination: assisted  living  Discharge Comments: Home            Salvatore Forrester MD  Hospitalist Service  [facility]  Securely message with [employer] (more info)  Text page via "[employer]" Paging/Directory   ______________________________________________________________________    Interval History   No acute events overnight. Pain pretty well controlled. Ortho planning non-operative management. Discussed importance of working with PT as much as able. SW following for dispo planning. Patient had no other concerns or complaints.     Physical Exam   Vital Signs: Temp: 98.9  F (37.2  C) Temp src: Temporal BP: (!) 154/81 Pulse: 75   Resp: 16 SpO2: 94 % O2 Device: None (Room air)    Weight: 177 lbs 0 oz    General: Alert, interactive, NAD, pleasant and cooperative  HEENT: AT/NC, sclera anicteric, PERRL, EOMI, MMM  Neck: Supple, no JVD  Chest/Resp: clear to auscultation bilaterally, no crackles or wheezes  Heart/CV: regular rate and rhythm, no murmur  Abdomen/GI: Soft, nontender, nondistended. +BS.  No rebound or guarding.  Extremities/MSK: 1+ BLE edema.  ROM testing not performed at bedside due to injury  Skin: Warm and dry  Neuro: Alert & oriented, answering questions appropriately     Medical Decision Making       45 MINUTES SPENT BY ME on the date of service doing chart review, history, exam, documentation & further activities per the note.      Data         Imaging results reviewed over the past 24 hrs:   No results found for this or any previous visit (from the past 24 hour(s)).

## 2024-02-28 NOTE — PROGRESS NOTES
Orthopedic Surgery  Milton Marmolejo  02/28/2024     Admit Date:  2/26/2024  Pelvic fractures: left sacral ala, pubic rami, acetab junction       Patient resting in bed. Endorses continued pain of LLE. No new ortho complaints.   Tolerating oral intake.        Temp:  [97.9  F (36.6  C)-98.9  F (37.2  C)] 98.9  F (37.2  C)  Pulse:  [66-77] 75  Resp:  [15-16] 16  BP: (102-154)/(65-84) 154/81  SpO2:  [94 %-96 %] 94 %    Alert and oriented  Dressing is clean, dry, and intact.   Minimal erythema of the surrounding skin.   Bilateral calves are soft, non-tender.  Bilateral lower extremity is NVI.  Sensation intact bilateral lower extremities  Patient able to resist dorsi and plantar flexion bilaterally  +Dp pulse    Labs:  Recent Labs   Lab Test 02/27/24  0611 02/26/24 2008 07/21/21  1537   WBC 5.4 9.7 5.9   HGB 10.7* 11.3* 13.0*    179 220         1. PLAN:   ASA 81 mg daily for DVT prophylaxis.     Mobilize with PT/OT    WBAT with walker    Continue current pain regimen       2. Disposition   Pending progress    Amena Gibson PA-C

## 2024-02-28 NOTE — CONSULTS
Care Management Initial Consult    General Information  Assessment completed with: Patient, Children, VM-chart review, daughter  Type of CM/SW Visit: Initial Assessment    Primary Care Provider verified and updated as needed: Yes   Readmission within the last 30 days: no previous admission in last 30 days      Reason for Consult: discharge planning  Advance Care Planning:            Communication Assessment  Patient's communication style: spoken language (English or Bilingual)    Hearing Difficulty or Deaf: yes   Wear Glasses or Blind: yes    Cognitive  Cognitive/Neuro/Behavioral: .WDL except, orientation  Level of Consciousness: alert  Arousal Level: opens eyes spontaneously  Orientation: disoriented to, time, place  Mood/Behavior: calm, cooperative  Best Language: 0 - No aphasia  Speech: clear, spontaneous    Living Environment:   People in home: alone     Current living Arrangements: independent living facility      Able to return to prior arrangements: no       Family/Social Support:  Care provided by: self, child(melany)  Provides care for: no one  Marital Status:   Children          Description of Support System: Supportive, Involved         Current Resources:   Patient receiving home care services:       Community Resources:    Equipment currently used at home: none, grab bar, toilet, grab bar, tub/shower  Supplies currently used at home:      Employment/Financial:  Employment Status:          Financial Concerns:             Does the patient's insurance plan have a 3 day qualifying hospital stay waiver?  No    Lifestyle & Psychosocial Needs:  Social Determinants of Health     Food Insecurity: Not on file   Depression: Not on file   Housing Stability: Not on file   Tobacco Use: Low Risk  (10/11/2022)    Patient History     Smoking Tobacco Use: Never     Smokeless Tobacco Use: Never     Passive Exposure: Not on file   Financial Resource Strain: Not on file   Alcohol Use: Not on file   Transportation Needs:  Not on file   Physical Activity: Not on file   Interpersonal Safety: Not on file   Stress: Not on file   Social Connections: Not on file       Functional Status:  Prior to admission patient needed assistance:   Dependent ADLs:: Independent  Dependent IADLs:: Medication Management       Mental Health Status:  Mental Health Status: No Current Concerns       Chemical Dependency Status:  Chemical Dependency Status: No Current Concerns           Additional Information:  SW consulted for discharge planning. Therapy is recommending TCU.  SW met with pt and dtr to discuss recommendation and obtain TCU requests.  [facility] choice is [facility], then [facility]. Private room. Referrals sent.    Pt lives alone at [facility]. Pt's spouse lives in their memory care unit. At baseline pt is independent with mobility and IADL/IADLs; dtr sets up medications.     PAIGE Lowe, [facility]  Inpatient Care Coordination  [facility]  [phone]

## 2024-02-28 NOTE — PLAN OF CARE
Goal Outcome Evaluation:                      Patient vital signs are at baseline: Yes  Patient able to ambulate as they were prior to admission or with assist devices provided by therapies during their stay:  No,  Reason:  A1-2 Pivot/ [place]-steady  Patient MUST void prior to discharge:  Yes  Patient able to tolerate oral intake:  Yes  Pain has adequate pain control using Oral analgesics:  Yes, tylenol, robaxin, oxy, atarax, lidocaine patch  Does patient have an identified :  Yes - daughters  Has goal D/C date and time been discussed with patient:  No,  Reason:  TBD      Pt has Lidocaine patches (2) on Left hip and Lower left back

## 2024-02-29 VITALS
BODY MASS INDEX: 25.4 KG/M2 | RESPIRATION RATE: 16 BRPM | SYSTOLIC BLOOD PRESSURE: 134 MMHG | OXYGEN SATURATION: 95 % | HEART RATE: 73 BPM | DIASTOLIC BLOOD PRESSURE: 80 MMHG | TEMPERATURE: 97.9 F | WEIGHT: 177 LBS

## 2024-02-29 PROCEDURE — 250N000013 HC RX MED GY IP 250 OP 250 PS 637: Performed by: STUDENT IN AN ORGANIZED HEALTH CARE EDUCATION/TRAINING PROGRAM

## 2024-02-29 PROCEDURE — 250N000013 HC RX MED GY IP 250 OP 250 PS 637: Performed by: PHYSICIAN ASSISTANT

## 2024-02-29 PROCEDURE — 99239 HOSP IP/OBS DSCHRG MGMT >30: CPT | Performed by: INTERNAL MEDICINE

## 2024-02-29 RX ORDER — ACETAMINOPHEN 500 MG
500-1000 TABLET ORAL EVERY 6 HOURS PRN
DISCHARGE
Start: 2024-02-29 | End: 2024-02-29

## 2024-02-29 RX ORDER — ACETAMINOPHEN 325 MG/1
650 TABLET ORAL 3 TIMES DAILY
DISCHARGE
Start: 2024-02-29 | End: 2024-03-14

## 2024-02-29 RX ORDER — LIDOCAINE 4 G/G
1 PATCH TOPICAL EVERY 24 HOURS
DISCHARGE
Start: 2024-02-29

## 2024-02-29 RX ORDER — OXYCODONE HYDROCHLORIDE 5 MG/1
2.5 TABLET ORAL EVERY 4 HOURS PRN
Qty: 10 TABLET | Refills: 0 | Status: SHIPPED | OUTPATIENT
Start: 2024-02-29

## 2024-02-29 RX ORDER — METHOCARBAMOL 500 MG/1
250 TABLET, FILM COATED ORAL 3 TIMES DAILY PRN
DISCHARGE
Start: 2024-02-29

## 2024-02-29 RX ORDER — AMOXICILLIN 250 MG
1 CAPSULE ORAL 2 TIMES DAILY PRN
DISCHARGE
Start: 2024-02-29

## 2024-02-29 RX ADMIN — ENALAPRIL MALEATE 10 MG: 10 TABLET ORAL at 09:11

## 2024-02-29 RX ADMIN — DONEPEZIL HYDROCHLORIDE 5 MG: 5 TABLET ORAL at 09:11

## 2024-02-29 RX ADMIN — AMLODIPINE BESYLATE 2.5 MG: 2.5 TABLET ORAL at 09:11

## 2024-02-29 RX ADMIN — ACETAMINOPHEN 975 MG: 325 TABLET, FILM COATED ORAL at 06:42

## 2024-02-29 RX ADMIN — TAMSULOSIN HYDROCHLORIDE 0.4 MG: 0.4 CAPSULE ORAL at 09:11

## 2024-02-29 RX ADMIN — HYDROXYZINE HYDROCHLORIDE 10 MG: 10 TABLET ORAL at 06:42

## 2024-02-29 ASSESSMENT — ACTIVITIES OF DAILY LIVING (ADL)
ADLS_ACUITY_SCORE: 40
ADLS_ACUITY_SCORE: 40
ADLS_ACUITY_SCORE: 36
ADLS_ACUITY_SCORE: 41
ADLS_ACUITY_SCORE: 40
ADLS_ACUITY_SCORE: 41
ADLS_ACUITY_SCORE: 40
ADLS_ACUITY_SCORE: 41
ADLS_ACUITY_SCORE: 36
ADLS_ACUITY_SCORE: 40
ADLS_ACUITY_SCORE: 36
ADLS_ACUITY_SCORE: 36

## 2024-02-29 NOTE — PLAN OF CARE
Goal Outcome Evaluation:      Plan of Care Reviewed With: patient    Overall Patient Progress: no changeOverall Patient Progress: no change    Outcome Evaluation: WBAT. Ortho and PT following.     /67 (BP Location: Right arm, Patient Position: Supine, Cuff Size: Adult Regular)   Pulse 78   Temp 98.1  F (36.7  C) (Temporal)   Resp 16   Wt 80.3 kg (177 lb)   SpO2 93%   BMI 25.40 kg/m      Patient vital signs are at baseline: Yes  Patient able to ambulate as they were prior to admission or with assist devices provided by therapies during their stay:  No,  Reason:  Assist of 2, walker, and gait belt.  Patient MUST void prior to discharge:  Yes, incontinent at times.  Patient able to tolerate oral intake:  Yes  Pain has adequate pain control using Oral analgesics:  Yes, Tylenol and Atarax given.  Does patient have an identified :  Yes, daughters is supportive.  Has goal D/C date and time been discussed with patient:  Yes, 1-2 days once TCU bed is available as pt lives at in an independent apartment.     Pt disoriented to time. Pleasant and cooperative. LS clear. Able to reposition slowly in bed with 1 assist. Pt did not get up this shift but prior was up with 2 assist, walker, and gait belt to and from bedside commode. CMS intact with no numbness/tingling. Voiding - continent most of the time. Smear of stool noted.

## 2024-02-29 NOTE — PLAN OF CARE
Care continued: 1900 - 2330  Goal Outcome Evaluation:      Plan of Care Reviewed With: patient    Outcome Evaluation: Verbalized understanding of education    Critical shift events: pt remains stable with moderate pain. PRN given with good results. Cpap at bedside- pt applied    A+O x2, disorientated to time and location   Lung sounds clear and diminished   BS present, passing flatus   Voids spontaneously with urinal at bedside     VSS   /68 (BP Location: Right arm)   Pulse 73   Temp 98.3  F (36.8  C) (Temporal)   Resp 17   Wt 80.3 kg (177 lb)   SpO2 95%   BMI 25.40 kg/m       RN will continue to maintain care and the treatment plan this shift, handing off care to oncoming RN at 2300 02/29/2024     Chloe Mendoza RN 02/29/2024 12:05 AM

## 2024-02-29 NOTE — DISCHARGE SUMMARY
[facility]  Discharge Summary  Name: Milton Marmolejo    MRN: [id number]  YOB: 1932    Age: 89 year old  Date of Discharge:  2/29/2024  Date of Admission: 2/26/2024  Primary Care Provider: Y[facility] [place]  Discharge Physician:  Hernandez Hart MD  Discharging Service:  Hospitalist      Hospital Course/Discharge Diagnoses:  Milton Marmolejo is a 89 year old male with a history of HTN, TIA, HLD, MGUS, NGOZI, CVA, Dementia who presents after a fall.  Found to have multiple pelvic fractures, orthopedic surgery consulted.  Ortho recommending non-operative management. PT assessment recommends TCU placement.    I assumed care on 2/29.  Milton is up in chair but still requiring assist of 2 to get from bed to chair.  He is quite pleasant and calm and aside from some soreness of his pelvis with movement denies other complaints at this point.  His daughter is at the bedside and they are quite agreeable to TCU placement which I am told has been found for today.  We discussed the natural history of pelvic fractures and anticipate gradual healing.  His pain is overall fairly well-controlled without narcotics but does have worsened pain with movement.  Trying to focus on nonnarcotic medications moving forward but will have low-dose 2.5 mg oxycodone available as needed for more severe symptoms.     Non displaced Left Sacral Ala Fracture  Non displaced Left Inferior Pubic Ramus Fracture  Non displaced Fracture at the Left Superior Pubic Root/Puboacetabular Junction that extends to the Superior Pubic Ramus  Mechanical fall day of admission while on a walking outside.  Able to ambulate after fall, but had persistent severe left groin pain.  Evaluation in the ED revealed multiple pelvic fractures as documented above.  Orthopedic surgery consulted.  - analgesic prn, trying avoid opioids as able; low dose oxycodone available for severe pain  - uses Atarax for itching pta  - WBAT  -  Orthopedic Surgery consulted, non-operative management per their recommendation  - PT/SW consults   -Discharge to TCU.     HTN  - continue Amlodipine and Enalapril     HLD  - continue Simvastatin     Hx CVA/TIA  - continue ASA and Simvastatin     CKD  - baseline creatinine 1.3-1.7.  At baseline on admission.     BPH  - continue Flomax     NGOZI  - continue cpap     Dementia  - continue Aricept     Hx of MGUS  - wbc, plt wnl.  Hgb 11.3 admission, at baseline is around 12.        Discharge Disposition:  Discharged to short-term care facility     Allergies:  Allergies   Allergen Reactions    Erythromycin      Other reaction(s): *Unknown    Memantine      Other reaction(s): *Unknown    Penicillins Itching        Discharge Medications:        Review of your medicines        START taking        Dose / Directions   Lidocaine 4 % Patch  Commonly known as: LIDOCARE      Dose: 1 patch  Place 1 patch onto the skin every 24 hours Apply over sore area of pelvis. To prevent lidocaine toxicity, patient should be patch free for 12 hrs daily.  Refills: 0     methocarbamol 500 MG tablet  Commonly known as: ROBAXIN      Dose: 250 mg  Take 0.5 tablets (250 mg) by mouth 3 times daily as needed for muscle spasms or other (pain)  Refills: 0     oxyCODONE 5 MG tablet  Commonly known as: ROXICODONE      Dose: 2.5 mg  Take 0.5 tablets (2.5 mg) by mouth every 4 hours as needed for severe pain (IF pain not managed with non-pharmacological and non-opioid interventions)  Quantity: 10 tablet  Refills: 0     senna-docusate 8.6-50 MG tablet  Commonly known as: SENOKOT-S/PERICOLACE      Dose: 1 tablet  Take 1 tablet by mouth 2 times daily as needed for constipation  Refills: 0            CONTINUE these medicines which may have CHANGED, or have new prescriptions. If we are uncertain of the size of tablets/capsules you have at home, strength may be listed as something that might have changed.        Dose / Directions   acetaminophen 325 MG  tablet  Commonly known as: TYLENOL  This may have changed:   medication strength  how much to take  when to take this  reasons to take this      Dose: 650 mg  Take 2 tablets (650 mg) by mouth 3 times daily for 14 days  Refills: 0            CONTINUE these medicines which have NOT CHANGED        Dose / Directions   amLODIPine 2.5 MG tablet  Commonly known as: NORVASC      Dose: 2.5 mg  Take 1 tablet (2.5 mg) by mouth  Refills: 0     ASPIRIN PO      Dose: 81 mg  Take 81 mg by mouth At Bedtime  Refills: 0     donepezil 2.5 mg half-tab  Commonly known as: ARICEPT      Dose: 5 mg  Take 5 mg by mouth daily  Refills: 0     enalapril 5 MG tablet  Commonly known as: VASOTEC      Dose: 10 mg  Take 10 mg by mouth daily  Refills: 0     fluticasone 50 MCG/ACT nasal spray  Commonly known as: FLONASE      Dose: 2 spray  Spray 2 sprays into both nostrils daily as needed  Refills: 0     hydrOXYzine HCl 10 MG tablet  Commonly known as: ATARAX      Dose: 10 mg  Take 10 mg by mouth 2 times daily as needed for itching  Refills: 0     multivitamin w/minerals tablet      Dose: 1 tablet  Take 1 tablet by mouth daily  Refills: 0     simvastatin 20 MG tablet  Commonly known as: ZOCOR      Dose: 20 mg  Take 20 mg by mouth at bedtime  Refills: 0     tamsulosin 0.4 MG capsule  Commonly known as: Flomax  Used for: Benign prostatic hyperplasia with weak urinary stream      Dose: 0.4 mg  Take 1 capsule (0.4 mg) by mouth daily  Quantity: 30 capsule  Refills: 3               Where to get your medicines        Some of these will need a paper prescription and others can be bought over the counter. Ask your nurse if you have questions.    Bring a paper prescription for each of these medications  oxyCODONE 5 MG tablet         Condition on Discharge:  Discharge condition: Stable       Code status on discharge: DNR / DNI     History of Illness:  See detailed admission note for full details.    Physical Exam:  Vital signs:  Temp: 97.9  F (36.6  C) Temp  "src: Temporal BP: 134/80 Pulse: 73   Resp: 16 SpO2: 95 % O2 Device: None (Room air)     Weight: 80.3 kg (177 lb)  Estimated body mass index is 25.4 kg/m  as calculated from the following:    Height as of 7/21/21: 1.778 m (5' 10\").    Weight as of this encounter: 80.3 kg (177 lb).    Wt Readings from Last 1 Encounters:   02/26/24 80.3 kg (177 lb)     General: Alert, awake, no acute distress.  Pleasant elderly man, up in chair.  HEENT: NC/AT, eyes anicteric, external occular movements intact, face symmetric.    Cardiac: RRR, S1, S2.  No murmurs appreciated.  Pulmonary: Normal chest rise, normal work of breathing.  Lungs CTA BL  Abdomen: soft, non-tender, non-distended.  Bowel Sounds Present.  No guarding.  Extremities: no deformities.  Warm, well perfused.  Skin: no rashes or lesions noted.  Warm and Dry.  Neuro: No focal deficits noted.  Speech clear.  Coordination and strength grossly normal.  Psych: Appropriate affect.    Procedures other than Imaging:  none     Imaging:  Results for orders placed or performed during the hospital encounter of 02/26/24   XR Pelvis and Hip Left 2 Views    Narrative    EXAM: XR PELVIS AND HIP LEFT 2 VIEWS  LOCATION: [facility]  DATE: 2/26/2024    INDICATION: Pelvic pain. Left hip pain.  COMPARISON: None.      Impression    IMPRESSION: Anatomic alignment left hip. No acute displaced left hip fracture is identified. Mild bilateral hip osteoarthritis. No acute displaced pelvic fracture is seen. Degenerative change lower lumbar spine and both SI joints. Arterial calcification.   Calcifications overlie the scrotum.   CT Pelvis Bone wo Contrast    Narrative    EXAM: CT PELVIS BONE WO CONTRAST  LOCATION: [facility]  DATE: 2/26/2024    INDICATION: Left groin pain.  COMPARISON: Pelvis and left hip radiographic exam earlier today.  TECHNIQUE: CT scan of the pelvis was performed without IV contrast. Multiplanar reformats were obtained. Dose reduction " techniques were used.  CONTRAST: None.    FINDINGS:    Nondisplaced radiographically occult left sacral ala, superior left pubic root/puboacetabular junction and the left inferior pubic rami fractures. No acute displaced lower lumbar spine fracture with degenerative change in the lower lumbar spine.   Symmetric degenerative SI joints. Diffuse bone demineralization. No right pelvic or sacral fracture is identified. No proximal femur fracture or CT finding for femoral head AVN. Mild arthritis at the symphysis pubis. No concerning bone lesion.    Arterial calcification. Colonic diverticulosis. No evidence for intestinal obstruction. No adenopathy. Mildly distended urinary bladder with enlarged prostate gland which should be correlated clinically. Shotty inguinal lymph nodes. No sizable hip joint   effusion. Small amount of edema or blood products seen along the left pelvic sidewall.       Impression    IMPRESSION:  1.  Nondisplaced radiographically occult left sacral ala fracture.  2.  Nondisplaced radiographically occult left inferior pubic ramus fracture.  3.  Nondisplaced radiographically occult fracture at the left superior pubic root/puboacetabular junction extends to the superior pubic ramus.  4.  No CT finding for proximal femur fracture or CT finding for femoral head AVN. Mild bilateral hip osteoarthritis.  5.  Distended urinary bladder with enlarged prostate gland. Correlate clinically.    NOTE: ABNORMAL REPORT    THE DICTATION ABOVE DESCRIBES AN ABNORMALITY FOR WHICH FOLLOW-UP IS NEEDED.         Consultations:  Consultation during this admission received from orthopedics.       Recent Lab Results:  Recent Labs   Lab 02/27/24  0611 02/26/24 2008   WBC 5.4 9.7   HGB 10.7* 11.3*   HCT 32.2* 34.4*   MCV 98 99    179          Lab Results   Component Value Date     02/27/2024     02/26/2024     07/21/2021     05/06/2021     09/02/2017     02/03/2016    Lab Results    Component Value Date    CHLORIDE 106 02/27/2024    CHLORIDE 103 02/26/2024    CHLORIDE 109 07/21/2021    CHLORIDE 106 05/06/2021    CHLORIDE 106 09/02/2017    CHLORIDE 107 02/03/2016    Lab Results   Component Value Date    BUN 20.6 02/27/2024    BUN 21.2 02/26/2024    BUN 19 07/21/2021    BUN 24 05/06/2021    BUN 20 09/02/2017    BUN 18 02/03/2016      Lab Results   Component Value Date    POTASSIUM 4.1 02/27/2024    POTASSIUM 3.9 02/26/2024    POTASSIUM 4.0 07/21/2021    POTASSIUM 3.9 05/06/2021    POTASSIUM 3.9 09/02/2017    POTASSIUM 4.1 02/03/2016    Lab Results   Component Value Date    CO2 24 02/27/2024    CO2 23 02/26/2024    CO2 29 07/21/2021    CO2 30 05/06/2021    CO2 30 09/02/2017    CO2 27 02/03/2016    Lab Results   Component Value Date    CR 1.37 02/27/2024    CR 1.41 02/26/2024    CR 1.23 07/21/2021    CR 1.31 05/06/2021    CR 1.17 09/02/2017    CR 1.32 02/03/2016             Pending Results:    Unresulted Labs Ordered in the Past 30 Days of this Admission       No orders found from 1/27/2024 to 2/27/2024.             Discharge Instructions and Follow-Up:   Discharge Procedure Orders   General info for SNF   Order Comments: Length of Stay Estimate: Short Term Care: Estimated # of Days <30  Condition at Discharge: Stable  Level of care:skilled   Rehabilitation Potential: Good  Admission H&P remains valid and up-to-date: Yes  Recent Chemotherapy: N/A  Use Nursing Home Standing Orders: Yes     Mantoux instructions   Order Comments: Give two-step Mantoux (PPD) Per Facility Policy Yes     Follow Up and recommended labs and tests   Order Comments: Follow up with Nursing home physician.  At this point the plan is to treat your pelvic fractures symptomatically.  If pain is failing to resolve after a couple of weeks or if symptoms worsen you may need repeat x-rays and further reevaluation.     Reason for your hospital stay   Order Comments: You were admitted for falls with pelvic fractures     Activity - Up  with nursing assistance     Order Specific Question Answer Comments   Is discharge order? Yes      Physical Therapy Adult Consult   Order Comments: Evaluate and treat as clinically indicated.    Reason:  falls, pelvic fractures     Occupational Therapy Adult Consult   Order Comments: Evaluate and treat as clinically indicated.    Reason:  falls, pelvic fractures     Fall precautions     Diet   Order Comments: Follow this diet upon discharge: Orders Placed This Encounter      Room Service      Combination Diet Regular Diet Adult     Order Specific Question Answer Comments   Is discharge order? Yes        Total time spent in face to face contact with the patient and coordinating discharge was:  60 Minutes.

## 2024-02-29 NOTE — PROGRESS NOTES
Care Management Discharge Note    Discharge Date: 02/29/2024       Discharge Disposition: Transitional Care    Discharge Services:      Discharge DME:      Discharge Transportation: agency    Private pay costs discussed: private room/amenity fees and transportation costs    Does the patient's insurance plan have a 3 day qualifying hospital stay waiver?  No    PAS Confirmation Code: [id number]  Patient/family educated on Medicare website which has current facility and service quality ratings: yes    Education Provided on the Discharge Plan: Yes  Persons Notified of Discharge Plans: patient, tcu, and daughter  Patient/Family in Agreement with the Plan: yes  Additional Information:  [facility], 11:40-12:40. Daughter aware of transport charges.  Informed TCU and dtr time of transport. Orders sent.    PAIGE Lowe, [facility]  Inpatient Care Coordination  [facility]  [phone]

## 2024-02-29 NOTE — PLAN OF CARE
Goal Outcome Evaluation: Pt up with 2 assist. Stands well but unable to take step so would use christel steady to transfer to chair. Tylenol and atarax had been given earlier for pain and no pain unless he moves. Good appetite. Small amount of urination. Pt daughter present. Pt alert to self and place situation but [place]. Dressed and ready for wheelchair discharge later this morning. Will monitor.

## 2024-03-01 ENCOUNTER — PATIENT OUTREACH (OUTPATIENT)
Dept: CARE COORDINATION | Facility: CLINIC | Age: 89
End: 2024-03-01
Payer: MEDICARE

## 2024-03-01 NOTE — PLAN OF CARE
Physical Therapy Discharge Summary    Reason for therapy discharge:    Discharged to transitional care facility.    Progress towards therapy goal(s). See goals on Care Plan in [facility] electronic health record for goal details.  Goals not met.  Barriers to achieving goals:   discharge from facility.    Therapy recommendation(s):    Continued therapy is recommended.  Rationale/Recommendations:  Recommend continued therapy at TCU to progress independence with mobility and strength.

## 2024-03-01 NOTE — PROGRESS NOTES
[facility]    Background: Transitional Care Management program identified per system criteria and reviewed by [facility] team for possible outreach.    Assessment: Upon chart review, CCRC Team member will not proceed with patient outreach related to this episode of Transitional Care Management program due to reason below:    Non-[facility] TCU: CCRC team member noted patient discharged to TCU/ARU/LTACH. Patient is not established with a [facility] currently supported by Primary Care-Care Coordination therefore handoff to Primary Care-Care Coordination is not appropriate at this time.    Plan: Transitional Care Management episode addressed appropriately per reason noted above.      ERIKA Butler  [phone]  [facility]     *Connected Care Resource Team does NOT follow patient ongoing. Referrals are identified based on internal discharge reports and the outreach is to ensure patient has an understanding of their discharge instructions.

## 2024-04-04 ENCOUNTER — DOCUMENTATION ONLY (OUTPATIENT)
Dept: OTHER | Facility: CLINIC | Age: 89
End: 2024-04-04
Payer: MEDICARE

## 2024-04-24 ENCOUNTER — DOCUMENTATION ONLY (OUTPATIENT)
Dept: OTHER | Facility: CLINIC | Age: 89
End: 2024-04-24
Payer: MEDICARE